# Patient Record
Sex: MALE | NOT HISPANIC OR LATINO | Employment: OTHER | URBAN - METROPOLITAN AREA
[De-identification: names, ages, dates, MRNs, and addresses within clinical notes are randomized per-mention and may not be internally consistent; named-entity substitution may affect disease eponyms.]

---

## 2018-07-10 ENCOUNTER — APPOINTMENT (OUTPATIENT)
Dept: RADIOLOGY | Facility: MEDICAL CENTER | Age: 58
End: 2018-07-10
Attending: EMERGENCY MEDICINE
Payer: MEDICAID

## 2018-07-10 ENCOUNTER — HOSPITAL ENCOUNTER (EMERGENCY)
Facility: MEDICAL CENTER | Age: 58
End: 2018-07-10
Attending: EMERGENCY MEDICINE
Payer: MEDICAID

## 2018-07-10 VITALS
WEIGHT: 229.28 LBS | TEMPERATURE: 96.8 F | DIASTOLIC BLOOD PRESSURE: 78 MMHG | BODY MASS INDEX: 32.82 KG/M2 | OXYGEN SATURATION: 94 % | HEIGHT: 70 IN | RESPIRATION RATE: 20 BRPM | HEART RATE: 62 BPM | SYSTOLIC BLOOD PRESSURE: 141 MMHG

## 2018-07-10 DIAGNOSIS — M19.012 OSTEOARTHRITIS OF BOTH SHOULDERS, UNSPECIFIED OSTEOARTHRITIS TYPE: ICD-10-CM

## 2018-07-10 DIAGNOSIS — M19.011 OSTEOARTHRITIS OF BOTH SHOULDERS, UNSPECIFIED OSTEOARTHRITIS TYPE: ICD-10-CM

## 2018-07-10 DIAGNOSIS — S46.912A SHOULDER STRAIN, LEFT, INITIAL ENCOUNTER: ICD-10-CM

## 2018-07-10 PROCEDURE — 73030 X-RAY EXAM OF SHOULDER: CPT | Mod: LT

## 2018-07-10 PROCEDURE — 99284 EMERGENCY DEPT VISIT MOD MDM: CPT

## 2018-07-10 PROCEDURE — 73030 X-RAY EXAM OF SHOULDER: CPT | Mod: RT

## 2018-07-10 RX ORDER — IBUPROFEN 800 MG/1
800 TABLET ORAL EVERY 8 HOURS PRN
Qty: 30 TAB | Refills: 0 | Status: SHIPPED | OUTPATIENT
Start: 2018-07-10 | End: 2018-07-23

## 2018-07-10 RX ORDER — PREDNISONE 20 MG/1
40 TABLET ORAL DAILY
Qty: 10 TAB | Refills: 0 | Status: SHIPPED | OUTPATIENT
Start: 2018-07-10 | End: 2018-07-15

## 2018-07-10 RX ORDER — LEVOTHYROXINE SODIUM 0.07 MG/1
50 TABLET ORAL
COMMUNITY
End: 2018-09-05 | Stop reason: SDUPTHER

## 2018-07-10 ASSESSMENT — PAIN SCALES - GENERAL
PAINLEVEL_OUTOF10: 5
PAINLEVEL_OUTOF10: 5
PAINLEVEL_OUTOF10: 7

## 2018-07-10 ASSESSMENT — ENCOUNTER SYMPTOMS
NECK PAIN: 0
FALLS: 0
NEUROLOGICAL NEGATIVE: 1
SHORTNESS OF BREATH: 0

## 2018-07-10 ASSESSMENT — LIFESTYLE VARIABLES: DO YOU DRINK ALCOHOL: NO

## 2018-07-10 NOTE — DISCHARGE INSTRUCTIONS
Shoulder Pain  Many things can cause shoulder pain, including:  · An injury to the area.  · Overuse of the shoulder.  · Arthritis.  The source of the pain can be:  · Inflammation.  · An injury to the shoulder joint.  · An injury to a tendon, ligament, or bone.  Follow these instructions at home:  Take these actions to help with your pain:  · Squeeze a soft ball or a foam pad as much as possible. This helps to keep the shoulder from swelling. It also helps to strengthen the arm.  · Take over-the-counter and prescription medicines only as told by your health care provider.  · If directed, apply ice to the area:  ¨ Put ice in a plastic bag.  ¨ Place a towel between your skin and the bag.  ¨ Leave the ice on for 20 minutes, 2-3 times per day. Stop applying ice if it does not help with the pain.  · If you were given a shoulder sling or immobilizer:  ¨ Wear it as told.  ¨ Remove it to shower or bathe.  ¨ Move your arm as little as possible, but keep your hand moving to prevent swelling.  Contact a health care provider if:  · Your pain gets worse.  · Your pain is not relieved with medicines.  · New pain develops in your arm, hand, or fingers.  Get help right away if:  · Your arm, hand, or fingers:  ¨ Tingle.  ¨ Become numb.  ¨ Become swollen.  ¨ Become painful.  ¨ Turn white or blue.  This information is not intended to replace advice given to you by your health care provider. Make sure you discuss any questions you have with your health care provider.  Document Released: 09/27/2006 Document Revised: 08/13/2017 Document Reviewed: 04/11/2016  Elsevier Interactive Patient Education © 2017 [employer]    Degenerative Arthritis  You have osteoarthritis. This is the wear and tear arthritis that comes with aging. It is also called degenerative arthritis. This is common in people past middle age. It is caused by stress on the joints. The large weight bearing joints of the lower extremities are most often affected. The knees,  hips, back, neck, and hands can become painful, swollen, and stiff. This is the most common type of arthritis. It comes on with age, carrying too much weight, or from an injury.  Treatment includes resting the sore joint until the pain and swelling improve. Crutches or a walker may be needed for severe flares. Only take over-the-counter or prescription medicines for pain, discomfort, or fever as directed by your caregiver. Local heat therapy may improve motion. Cortisone shots into the joint are sometimes used to reduce pain and swelling during flares.  Osteoarthritis is usually not crippling and progresses slowly. There are things you can do to decrease pain:  · Avoid high impact activities.   · Exercise regularly.   · Low impact exercises such as walking, biking and swimming help to keep the muscles strong and keep normal joint function.   · Stretching helps to keep your range of motion.   · Lose weight if you are overweight. This reduces joint stress.   In severe cases when you have pain at rest or increasing disability, joint surgery may be helpful. See your caregiver for follow-up treatment as recommended.   SEEK IMMEDIATE MEDICAL CARE IF:   · You have severe joint pain.   · Marked swelling and redness in your joint develops.   · You develop a high fever.   Document Released: 12/18/2006 Document Revised: 03/11/2013 Document Reviewed: 05/19/2008  [employer]® Patient Information ©2013 [employer].

## 2018-07-10 NOTE — ED PROVIDER NOTES
ED Provider Note    Scribed for Sadia Solomon D.O. by Rufino Dennis. 7/10/2018, 10:00 AM.    Primary care provider: No primary care provider on file.  Means of arrival: walk in  History obtained from: patient  History limited by: none    CHIEF COMPLAINT  Chief Complaint   Patient presents with   • Shoulder Pain     LAURA Fernandez is a 57 y.o. male with a history of bilateral shoulder dislocation who presents to the Emergency Department complaining of sudden and constant left shoulder pain stating yesterday. Me reports associated shoulder dislocation. Patient state that he was working in the attic yesterday, holding a cross beam and turned his body, causing his left shoulder to possibly dislocate. He presents to the ED for evaluation of persistent pain and possible continued dislocation. Patient has a history of dislocation and arthritis of his bilateral shoulders that was being followed and treated with cortisone by an orthopedist in [place], [place]. He is a recent resident here in [place] and does not have an attending orthopedist in [facility] but is scheduled to follow up with a primary in the coming weeks.  Patient denies neck pain, distal numbness, wrist pain, elbow pain.     REVIEW OF SYSTEMS  Review of Systems   Respiratory: Negative for shortness of breath.    Cardiovascular: Positive for chest pain.   Musculoskeletal: Positive for joint pain. Negative for falls and neck pain.        Shoulder pain, dislocation. Negative wrist pain, elbow pain   Neurological: Negative.    E.    PAST MEDICAL HISTORY   has a past medical history of Thyroid activity decreased.    SURGICAL HISTORY  patient denies any surgical history    SOCIAL HISTORY  Social History   Substance Use Topics   • Smoking status: Never Smoker   • Smokeless tobacco: Current User   • Alcohol use No      History   Drug Use No       FAMILY HISTORY  None noted    CURRENT MEDICATIONS  Home Medications     Reviewed by Meredith Alexander,  "FÉLIX (Registered Nurse) on 07/10/18 at 0954  Med List Status: Partial   Medication Last Dose Status   levothyroxine (SYNTHROID) 75 MCG Tab  Active                ALLERGIES  No Known Allergies    PHYSICAL EXAM  VITAL SIGNS: /85   Pulse (!) 58   Temp 36.4 °C (97.5 °F)   Resp 16   Ht 1.778 m (5' 10\")   Wt 104 kg (229 lb 4.5 oz)   SpO2 94%   BMI 32.90 kg/m²   Vitals reviewed.  Constitutional: Patient is oriented to person, place, and time. Appears well-developed and well-nourished. No distress.    Head: Normocephalic and atraumatic.   Mouth/Throat: Oropharynx is clear and moist  Eyes: Conjunctivae are normal.   Neck: Normal range of motion. Neck supple.  Musculoskeletal: No edema and no tenderness. Diffuse tenderness to right shoulder no changes in ROM. Mild fullness and diffuse tenderness of the left shoulder with no deformities. Reduced ROM  Neurological: No focal deficits.   Skin: Skin is warm and dry. No erythema. No pallor.   Psychiatric: Patient has a normal mood and affect.     RADIOLOGY  DX-SHOULDER 2+ LEFT   Final Result      1.  Severe degenerative change of LEFT shoulder.   2.  No fracture or dislocation.      DX-SHOULDER 2+ RIGHT   Final Result      1.  Mild to moderate degenerative change of RIGHT shoulder.   2.  No fracture or dislocation.      The radiologist's interpretation of all radiological studies have been reviewed by me.    COURSE & MEDICAL DECISION MAKING  Pertinent Labs & Imaging studies reviewed. (See chart for details)    Obtained and reviewed past medical records that indicate no prior visits.    10:00 AM - Patient seen and examined at bedside.  Patient does not appear to have any deformities or significant dislocations.  There is some fullness of the left side anteriorly over the shoulder.  Patient has chronic pain to both these areas, right is actually worse on the chronic level than the left.  He is requesting x-rays of both shoulders. The differential diagnoses include but " are not limited to: fracture vs strain vs dislocation      11:52 AM - Recheck: Patient re-evaluated at [facility]. Patient's radiology results discussed.  There is moderate to severe osteoarthritic changes.  No fracture or dislocation.  Discussed patient's condition and treatment plan. He is instructed to ice the extremity and use ibuprofen as needed for pain control. Patient will be discharged with instructions and provided with strict return precautions. Patient will be sent home with a prescription for Decadron. Advised to follow up with his primary to establish and orthopedist in [facility]. Instructed to return to Emergency Department immediately if any new or worsening symptoms.    The patient will return for new or worsening symptoms and is stable at the time of discharge.    The patient is referred to a primary physician for blood pressure management, diabetic screening, and for all other preventative health concerns.    DISPOSITION:  Patient will be discharged home in stable condition.    FOLLOW UP:  [facility], Emergency Dept  [address] [id number] [phone]    If symptoms worsen    Your Physician  Varies      as planned    Flako Morales M.D.  [address] [facility] [address] [facility] [address] [address]  [phone]    Schedule an appointment as soon as possible for a visit        OUTPATIENT MEDICATIONS:  Discharge Medication List as of 7/10/2018 12:02 PM      START taking these medications    Details   ibuprofen (MOTRIN) 800 MG Tab Take 1 Tab by mouth every 8 hours as needed., Disp-30 Tab, R-0, Print Rx Paper      predniSONE (DELTASONE) 20 MG Tab Take 2 Tabs by mouth every day for 5 days., Disp-10 Tab, R-0, Print Rx Paper           FINAL IMPRESSION  1. Osteoarthritis of both shoulders, unspecified osteoarthritis type    2. Shoulder strain, left, initial encounter          I, Rufino Dennis (Scribe), am scribing for, and in the presence of, Sadia Solomon D.O..    Electronically signed by:  Rufino Dennis (Scribe), 7/10/2018    I, Sadia Solomon D.O. personally performed the services described in this documentation, as scribed by Rufino Dennis in my presence, and it is both accurate and complete.    The note accurately reflects work and decisions made by me.  Sadia Solomon  7/10/2018  5:24 PM

## 2018-07-10 NOTE — ED NOTES
Pt reports left shoulder pain that started yesterday when he was working in the attic, pt states he was holding on to a truss and turned, this motion caused the pain, pt's wife massaged the shoulder and pt used ice pack with no relief

## 2018-07-10 NOTE — ED TRIAGE NOTES
"Chief Complaint   Patient presents with   • Shoulder Pain     L     Ambulatory to triage. Reports L shoulder pain, thinks he dislocated it and put it back in place now. No obvious deformity. Hx of the same.      /85   Pulse (!) 58   Temp 36.4 °C (97.5 °F)   Resp 16   Ht 1.778 m (5' 10\")   Wt 104 kg (229 lb 4.5 oz)   SpO2 94%   BMI 32.90 kg/m²     Pt Informed regarding triage process and verbalized understanding to inform triage tech or RN for any changes in condition.  Placed in lobby.    "

## 2018-07-12 ENCOUNTER — OFFICE VISIT (OUTPATIENT)
Dept: INTERNAL MEDICINE | Facility: MEDICAL CENTER | Age: 58
End: 2018-07-12
Payer: MEDICAID

## 2018-07-12 VITALS
BODY MASS INDEX: 33.21 KG/M2 | HEIGHT: 70 IN | WEIGHT: 232 LBS | OXYGEN SATURATION: 98 % | DIASTOLIC BLOOD PRESSURE: 76 MMHG | HEART RATE: 83 BPM | RESPIRATION RATE: 16 BRPM | TEMPERATURE: 97.9 F | SYSTOLIC BLOOD PRESSURE: 130 MMHG

## 2018-07-12 DIAGNOSIS — N52.9 ERECTILE DYSFUNCTION, UNSPECIFIED ERECTILE DYSFUNCTION TYPE: ICD-10-CM

## 2018-07-12 DIAGNOSIS — E55.9 VITAMIN D DEFICIENCY: ICD-10-CM

## 2018-07-12 DIAGNOSIS — E66.9 OBESITY (BMI 30-39.9): ICD-10-CM

## 2018-07-12 DIAGNOSIS — Z00.00 HEALTH MAINTENANCE EXAMINATION: Primary | ICD-10-CM

## 2018-07-12 DIAGNOSIS — E03.9 HYPOTHYROIDISM, UNSPECIFIED TYPE: ICD-10-CM

## 2018-07-12 DIAGNOSIS — M19.111 POST-TRAUMATIC OSTEOARTHRITIS OF BOTH SHOULDERS: ICD-10-CM

## 2018-07-12 DIAGNOSIS — M19.112 POST-TRAUMATIC OSTEOARTHRITIS OF BOTH SHOULDERS: ICD-10-CM

## 2018-07-12 PROCEDURE — 99204 OFFICE O/P NEW MOD 45 MIN: CPT | Mod: GC | Performed by: INTERNAL MEDICINE

## 2018-07-12 ASSESSMENT — PATIENT HEALTH QUESTIONNAIRE - PHQ9: CLINICAL INTERPRETATION OF PHQ2 SCORE: 0

## 2018-07-12 NOTE — ASSESSMENT & PLAN NOTE
- Colon cancer colonoscopy screening: order today.   - Prostate cancer screening: not indicated at this age  - Lung cancer screening: non smoker no screening indicated  - AAA screening: Not indicated.  - Osteoporosis screening: Not indicated, no risk factor.  - HIV screening: done before it was negative   - flu vaccine: recommended to patient, dose not want to take it.   - Tdap: Given in 2018  - PNA vac: not indicated.   - Basic Labs within the last 12 months: order CBC, CMP, lipid profile, hemoglobin A1c and TSH    - Vit D: Order  - Shingles vac: Recommended to patient.

## 2018-07-12 NOTE — ASSESSMENT & PLAN NOTE
- erectile dysfunction for 2 years, mainly difficulty maintaining erection, noted decreased sexual desire, still has early morning erection.   - Denies intermittent claudication, chest pain and shortness of breath.  - Physical examination within normal.  - No history of diabetes or hypertension.  Plan:  -Differential diagnoses include peripheral vascular disease versus hypogonadism and performance anxiety.  - Order hemoglobin A1c, serum testosterone total.  -  Follow-up in 4 weeks.
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
## 2018-07-12 NOTE — ASSESSMENT & PLAN NOTE
-Diagnosed 2 months ago.  - On vitamin D 5000 international units daily.  Plan:  - Recheck vitamin D level.  - We'll consider switching the patient to low dose maintenance vitamin D

## 2018-07-12 NOTE — LETTER
[facility]  Perla Sherman M.D.  [address]  [place] [address]  Fax: [phone]   Authorization for Release/Disclosure of   Protected Health Information   Name: JARED FERNANDEZ : 1960 SSN: [id number]   Address: [address] [place] [address] [place] [address] Phone:    [phone] (home)    I authorize the entity listed below to release/disclose the PHI below to:   [facility]/Perla Sherman M.D. and Perla Sherman M.D.   Provider or Entity Name:     Address   City, State, Zip   Phone:      Fax:     Reason for request: continuity of care   Information to be released:    [  ] LAST COLONOSCOPY,  including any PATH REPORT and follow-up  [  ] LAST FIT/COLOGUARD RESULT [  ] LAST DEXA  [  ] LAST MAMMOGRAM  [  ] LAST PAP  [  ] LAST LABS [  ] RETINA EXAM REPORT  [  ] IMMUNIZATION RECORDS  [  ] Release all info      [  ] Check here and initial the line next to each item to release ALL health information INCLUDING  _____ Care and treatment for drug and / or alcohol abuse  _____ HIV testing, infection status, or AIDS  _____ Genetic Testing    DATES OF SERVICE OR TIME PERIOD TO BE DISCLOSED: _____________  I understand and acknowledge that:  * This Authorization may be revoked at any time by you in writing, except if your health information has already been used or disclosed.  * Your health information that will be used or disclosed as a result of you signing this authorization could be re-disclosed by the recipient. If this occurs, your re-disclosed health information may no longer be protected by State or Federal laws.  * You may refuse to sign this Authorization. Your refusal will not affect your ability to obtain treatment.  * This Authorization becomes effective upon signing and will  on (date) __________.      If no date is indicated, this Authorization will  one (1) year from the signature date.    Name: Jared Fernandez    Signature:   Date:     2018            PLEASE FAX REQUESTED RECORDS BACK TO: [phone]

## 2018-07-12 NOTE — LETTER
[facility]  Perla Sherman M.D.  [address]  [place] [address]  Fax: [phone]   Authorization for Release/Disclosure of   Protected Health Information   Name: JARED FERNANDEZ : 1960 SSN: [id number]   Address: [address] [place] [address] [place] [address] Phone:    [phone] (home)    I authorize the entity listed below to release/disclose the PHI below to:   [facility]/Perla Sherman M.D. and Perla Sherman M.D.   Provider or Entity Name:     Address   City, State, Zip   Phone:      Fax:     Reason for request: continuity of care   Information to be released:    [  ] LAST COLONOSCOPY,  including any PATH REPORT and follow-up  [  ] LAST FIT/COLOGUARD RESULT [  ] LAST DEXA  [  ] LAST MAMMOGRAM  [  ] LAST PAP  [  ] LAST LABS [  ] RETINA EXAM REPORT  [  ] IMMUNIZATION RECORDS  [  ] Release all info      [  ] Check here and initial the line next to each item to release ALL health information INCLUDING  _____ Care and treatment for drug and / or alcohol abuse  _____ HIV testing, infection status, or AIDS  _____ Genetic Testing    DATES OF SERVICE OR TIME PERIOD TO BE DISCLOSED: _____________  I understand and acknowledge that:  * This Authorization may be revoked at any time by you in writing, except if your health information has already been used or disclosed.  * Your health information that will be used or disclosed as a result of you signing this authorization could be re-disclosed by the recipient. If this occurs, your re-disclosed health information may no longer be protected by State or Federal laws.  * You may refuse to sign this Authorization. Your refusal will not affect your ability to obtain treatment.  * This Authorization becomes effective upon signing and will  on (date) __________.      If no date is indicated, this Authorization will  one (1) year from the signature date.    Name: Jared Fernandez    Signature:   Date:     2018            PLEASE FAX REQUESTED RECORDS BACK TO: [phone]
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
## 2018-07-12 NOTE — PATIENT INSTRUCTIONS
- please follow up referrals   - please do the labs   - please start taking Tylenol 1gm 3 times a day    Arthritis  Introduction  Arthritis is a term that is commonly used to refer to joint pain or joint disease. There are more than 100 types of arthritis.  What are the causes?  The most common cause of this condition is wear and tear of a joint. Other causes include:  · Gout.  · Inflammation of a joint.  · An infection of a joint.  · Sprains and other injuries near the joint.  · A drug reaction or allergic reaction.  In some cases, the cause may not be known.  What are the signs or symptoms?  The main symptom of this condition is pain in the joint with movement. Other symptoms include:  · Redness, swelling, or stiffness at a joint.  · Warmth coming from the joint.  · Fever.  · Overall feeling of illness.  How is this diagnosed?  This condition may be diagnosed with a physical exam and tests, including:  · Blood tests.  · Urine tests.  · Imaging tests, such as MRI, X-rays, or a CT scan.  Sometimes, fluid is removed from a joint for testing.  How is this treated?  Treatment for this condition may involve:  · Treatment of the cause, if it is known.  · Rest.  · Raising (elevating) the joint.  · Applying cold or hot packs to the joint.  · Medicines to improve symptoms and reduce inflammation.  · Injections of a steroid such as cortisone into the joint to help reduce pain and inflammation.  Depending on the cause of your arthritis, you may need to make lifestyle changes to reduce stress on your joint. These changes may include exercising more and losing weight.  Follow these instructions at home:  Medicines  · Take over-the-counter and prescription medicines only as told by your health care provider.  · Do not take aspirin to relieve pain if gout is suspected.  Activity  · Rest your joint if told by your health care provider. Rest is important when your disease is active and your joint feels painful, swollen, or  stiff.  · Avoid activities that make the pain worse. It is important to balance activity with rest.  · Exercise your joint regularly with range-of-motion exercises as told by your health care provider. Try doing low-impact exercise, such as:  ¨ Swimming.  ¨ Water aerobics.  ¨ Biking.  ¨ Walking.  Joint Care   · If your joint is swollen, keep it elevated if told by your health care provider.  · If your joint feels stiff in the morning, try taking a warm shower.  · If directed, apply heat to the joint. If you have diabetes, do not apply heat without permission from your health care provider.  ¨ Put a towel between the joint and the hot pack or heating pad.  ¨ Leave the heat on the area for 20-30 minutes.  · If directed, apply ice to the joint:  ¨ Put ice in a plastic bag.  ¨ Place a towel between your skin and the bag.  ¨ Leave the ice on for 20 minutes, 2-3 times per day.  · Keep all follow-up visits as told by your health care provider. This is important.  Contact a health care provider if:  · The pain gets worse.  · You have a fever.  Get help right away if:  · You develop severe joint pain, swelling, or redness.  · Many joints become painful and swollen.  · You develop severe back pain.  · You develop severe weakness in your leg.  · You cannot control your bladder or bowels.  This information is not intended to replace advice given to you by your health care provider. Make sure you discuss any questions you have with your health care provider.  Document Released: 01/25/2006 Document Revised: 05/25/2017 Document Reviewed: 03/14/2016  © 2017 [employer]

## 2018-07-12 NOTE — ASSESSMENT & PLAN NOTE
- Body mass index is 33.29 kg/m².  - Patient was advised to eat healthy and increase his physical activity.  - Goal moderate intensity exercise 150 minutes per week.

## 2018-07-12 NOTE — ASSESSMENT & PLAN NOTE
- Diagnosed couple of months ago.   - denies any symptoms, does not know what his TSH was.   - currently on levothyroxine 75 µg daily.  Plan:  - Repeat TSH  - Follow-up in 4 weeks

## 2018-07-19 ENCOUNTER — TELEPHONE (OUTPATIENT)
Dept: INTERNAL MEDICINE | Facility: MEDICAL CENTER | Age: 58
End: 2018-07-19

## 2018-07-19 DIAGNOSIS — E03.9 HYPOTHYROIDISM, UNSPECIFIED TYPE: ICD-10-CM

## 2018-07-19 DIAGNOSIS — E66.9 OBESITY (BMI 30-39.9): ICD-10-CM

## 2018-07-19 DIAGNOSIS — E55.9 VITAMIN D DEFICIENCY: ICD-10-CM

## 2018-07-19 DIAGNOSIS — Z00.00 HEALTH MAINTENANCE EXAMINATION: ICD-10-CM

## 2018-07-19 DIAGNOSIS — N52.9 ERECTILE DYSFUNCTION, UNSPECIFIED ERECTILE DYSFUNCTION TYPE: ICD-10-CM

## 2018-07-23 DIAGNOSIS — M19.112 POST-TRAUMATIC OSTEOARTHRITIS OF BOTH SHOULDERS: ICD-10-CM

## 2018-07-23 DIAGNOSIS — M19.111 POST-TRAUMATIC OSTEOARTHRITIS OF BOTH SHOULDERS: ICD-10-CM

## 2018-07-23 RX ORDER — NAPROXEN 500 MG/1
500 TABLET ORAL 2 TIMES DAILY WITH MEALS
Qty: 60 TAB | Refills: 2 | Status: SHIPPED | OUTPATIENT
Start: 2018-07-23

## 2018-09-04 ENCOUNTER — PATIENT MESSAGE (OUTPATIENT)
Dept: INTERNAL MEDICINE | Facility: MEDICAL CENTER | Age: 58
End: 2018-09-04

## 2018-09-04 NOTE — TELEPHONE ENCOUNTER
From: Jared Fernandez  To: Perla Sherman M.D.  Sent: 9/4/2018 11:24 AM PDT  Subject: Prescription Question    Please send renewal for my Thyroid medication to [facility] in [facility]. I am completely out.    Thank you    Jared

## 2018-09-05 NOTE — TELEPHONE ENCOUNTER
PT SEEN: 7/12/18 WITH Dr. Sherman NEXT APPT None  Was the patient seen in the last year in this department? Yes     Does patient have an active prescription for medications requested? No     Received Request Via: Patient

## 2018-09-06 RX ORDER — LEVOTHYROXINE SODIUM 0.07 MG/1
75 TABLET ORAL
Qty: 90 TAB | Refills: 0 | Status: SHIPPED | OUTPATIENT
Start: 2018-09-06 | End: 2018-10-16 | Stop reason: SDUPTHER

## 2018-10-16 ENCOUNTER — OFFICE VISIT (OUTPATIENT)
Dept: MEDICAL GROUP | Facility: MEDICAL CENTER | Age: 58
End: 2018-10-16
Payer: COMMERCIAL

## 2018-10-16 VITALS
SYSTOLIC BLOOD PRESSURE: 132 MMHG | OXYGEN SATURATION: 94 % | BODY MASS INDEX: 34.07 KG/M2 | DIASTOLIC BLOOD PRESSURE: 82 MMHG | WEIGHT: 230 LBS | HEIGHT: 69 IN | TEMPERATURE: 99.5 F | RESPIRATION RATE: 14 BRPM | HEART RATE: 72 BPM

## 2018-10-16 DIAGNOSIS — M19.112 POST-TRAUMATIC OSTEOARTHRITIS OF BOTH SHOULDERS: ICD-10-CM

## 2018-10-16 DIAGNOSIS — M19.111 POST-TRAUMATIC OSTEOARTHRITIS OF BOTH SHOULDERS: ICD-10-CM

## 2018-10-16 DIAGNOSIS — Z28.21 REFUSED INFLUENZA VACCINE: ICD-10-CM

## 2018-10-16 DIAGNOSIS — Z00.00 ROUTINE GENERAL MEDICAL EXAMINATION AT A HEALTH CARE FACILITY: ICD-10-CM

## 2018-10-16 DIAGNOSIS — E66.9 OBESITY (BMI 30-39.9): ICD-10-CM

## 2018-10-16 DIAGNOSIS — Z12.11 COLON CANCER SCREENING: ICD-10-CM

## 2018-10-16 DIAGNOSIS — E03.9 IDIOPATHIC HYPOTHYROIDISM: ICD-10-CM

## 2018-10-16 PROCEDURE — 99396 PREV VISIT EST AGE 40-64: CPT | Performed by: FAMILY MEDICINE

## 2018-10-16 RX ORDER — LEVOTHYROXINE SODIUM 0.07 MG/1
75 TABLET ORAL
Qty: 90 TAB | Refills: 2 | Status: SHIPPED | OUTPATIENT
Start: 2018-10-16 | End: 2018-12-20 | Stop reason: SDUPTHER

## 2018-10-16 ASSESSMENT — ENCOUNTER SYMPTOMS
MYALGIAS: 0
WHEEZING: 0
PALPITATIONS: 0
FOCAL WEAKNESS: 0
DIARRHEA: 0
SHORTNESS OF BREATH: 0
CHILLS: 0
HEARTBURN: 0
DOUBLE VISION: 0
WEIGHT LOSS: 0
VOMITING: 0
BLOOD IN STOOL: 0
NAUSEA: 0
SORE THROAT: 0
INSOMNIA: 1
BACK PAIN: 0
MEMORY LOSS: 0
ORTHOPNEA: 0
TINGLING: 1
BRUISES/BLEEDS EASILY: 0
BLURRED VISION: 0
NERVOUS/ANXIOUS: 0
TREMORS: 0
DIAPHORESIS: 0
DIZZINESS: 0
HALLUCINATIONS: 0
NECK PAIN: 0
SEIZURES: 0
FEVER: 0
CLAUDICATION: 0
SENSORY CHANGE: 0
HEMOPTYSIS: 0
SPEECH CHANGE: 0
COUGH: 0
SPUTUM PRODUCTION: 0
DEPRESSION: 0
HEADACHES: 0
LOSS OF CONSCIOUSNESS: 0
ABDOMINAL PAIN: 0

## 2018-10-16 ASSESSMENT — LIFESTYLE VARIABLES: SUBSTANCE_ABUSE: 0

## 2018-10-16 NOTE — PROGRESS NOTES
Subjective:      Jared Fernandez is a 57 y.o. male who presents with Annual Exam        He is here for his general examination and to establish care.    HPI     has a past medical history of Alcohol abuse; Collar bone fracture; and Thyroid activity decreased.   has a past surgical history that includes hernia repair; ankle orif (Left); carpal tunnel release (Right); appendectomy child; ankle orif (Right); wrist arthroscopy (Right); and knee reconstruction (Right).  family history includes Breast Cancer in his mother; Dementia in his mother; Diabetes in his mother; Hyperlipidemia in his mother; Hypertension in his mother; No Known Problems in his father.   reports that he has never smoked. He has quit using smokeless tobacco. His smokeless tobacco use included Chew. He reports that he does not drink alcohol or use drugs.      Current Outpatient Prescriptions:   •  levothyroxine (SYNTHROID) 75 MCG Tab, Take 1 Tab by mouth Every morning on an empty stomach., Disp: 90 Tab, Rfl: 2  •  naproxen (NAPROSYN) 500 MG Tab, Take 1 Tab by mouth 2 times a day, with meals., Disp: 60 Tab, Rfl: 2  has No Known Allergies.    Review of Systems   Constitutional: Positive for malaise/fatigue. Negative for chills, diaphoresis, fever and weight loss.   HENT: Negative for congestion, hearing loss, sore throat and tinnitus.    Eyes: Negative for blurred vision and double vision.   Respiratory: Negative for cough, hemoptysis, sputum production, shortness of breath and wheezing.    Cardiovascular: Negative for chest pain, palpitations, orthopnea, claudication and leg swelling.   Gastrointestinal: Negative for abdominal pain, blood in stool, diarrhea, heartburn, nausea and vomiting.   Genitourinary: Negative for dysuria, frequency, hematuria and urgency.   Musculoskeletal: Positive for joint pain. Negative for back pain, myalgias and neck pain.        Shoulder pain bilateral, ankle pain bilateral   Skin: Negative for rash.   Neurological:  "Positive for tingling. Negative for dizziness, tremors, sensory change, speech change, focal weakness, seizures, loss of consciousness and headaches.        Numbness left lateral leg  Left hand tingling   Endo/Heme/Allergies: Negative for environmental allergies. Does not bruise/bleed easily.   Psychiatric/Behavioral: Negative for depression, hallucinations, memory loss, substance abuse and suicidal ideas. The patient has insomnia. The patient is not nervous/anxious.           Objective:     /82   Pulse 72   Temp 37.5 °C (99.5 °F)   Resp 14   Ht 1.753 m (5' 9\")   Wt 104.3 kg (230 lb)   SpO2 94%   BMI 33.97 kg/m²      Physical Exam   Constitutional: He is oriented to person, place, and time. He appears well-developed and well-nourished.   HENT:   Head: Normocephalic and atraumatic.   Mouth/Throat: Oropharynx is clear and moist.   Eyes: Pupils are equal, round, and reactive to light. EOM are normal. Left eye exhibits no discharge.   Neck: Normal range of motion. Neck supple. No JVD present. No thyromegaly present.   Cardiovascular: Normal rate, regular rhythm and normal heart sounds.    No murmur heard.  Pulmonary/Chest: Effort normal and breath sounds normal. No respiratory distress. He has no wheezes. He has no rales.   Abdominal: Soft. Bowel sounds are normal. He exhibits no distension and no mass. There is no tenderness.   Musculoskeletal: Normal range of motion. He exhibits no edema.   Numerous scars   Lymphadenopathy:     He has no cervical adenopathy.   Neurological: He is alert and oriented to person, place, and time. Coordination normal.   Skin: Skin is warm and dry. No rash noted. No erythema.   Psychiatric: He has a normal mood and affect. His behavior is normal.   Vitals reviewed.              Assessment/Plan:     1. Routine general medical examination at a health care facility  He is generally well.  Medical problems are stable.    2. Colon cancer screening  Referral discussed and placed  - " REFERRAL TO GASTROENTEROLOGY    3. Obesity (BMI 30-39.9)  Mild obesity, target of 210#    4. Refused influenza vaccine  Does not take this    5. Post-traumatic osteoarthritis of both shoulders  Long term problem, stable.  Referral discussed and placed  - REFERRAL TO ORTHOPEDICS    6. Idiopathic hypothyroidism  Stable problem.  Routine orders discussed and placed  - TSH; Future  - levothyroxine (SYNTHROID) 75 MCG Tab; Take 1 Tab by mouth Every morning on an empty stomach.  Dispense: 90 Tab; Refill: 2

## 2018-12-20 DIAGNOSIS — E03.9 IDIOPATHIC HYPOTHYROIDISM: ICD-10-CM

## 2018-12-20 RX ORDER — LEVOTHYROXINE SODIUM 0.07 MG/1
75 TABLET ORAL
Qty: 30 TAB | Refills: 0 | Status: SHIPPED | OUTPATIENT
Start: 2018-12-20 | End: 2019-01-16 | Stop reason: SDUPTHER

## 2019-01-16 DIAGNOSIS — E03.9 IDIOPATHIC HYPOTHYROIDISM: ICD-10-CM

## 2019-01-16 RX ORDER — LEVOTHYROXINE SODIUM 0.07 MG/1
75 TABLET ORAL
Qty: 30 TAB | Refills: 4 | Status: SHIPPED | OUTPATIENT
Start: 2019-01-16 | End: 2019-07-14 | Stop reason: SDUPTHER

## 2019-02-28 ENCOUNTER — HOSPITAL ENCOUNTER (OUTPATIENT)
Dept: LAB | Facility: MEDICAL CENTER | Age: 59
End: 2019-02-28
Attending: FAMILY MEDICINE
Payer: COMMERCIAL

## 2019-02-28 DIAGNOSIS — E03.9 IDIOPATHIC HYPOTHYROIDISM: ICD-10-CM

## 2019-02-28 LAB — TSH SERPL DL<=0.005 MIU/L-ACNC: 2.33 UIU/ML (ref 0.38–5.33)

## 2019-02-28 PROCEDURE — 36415 COLL VENOUS BLD VENIPUNCTURE: CPT

## 2019-02-28 PROCEDURE — 84443 ASSAY THYROID STIM HORMONE: CPT

## 2019-03-04 ENCOUNTER — HOSPITAL ENCOUNTER (OUTPATIENT)
Dept: LAB | Facility: MEDICAL CENTER | Age: 59
End: 2019-03-04
Attending: FAMILY MEDICINE
Payer: COMMERCIAL

## 2019-03-04 DIAGNOSIS — N52.9 ERECTILE DYSFUNCTION, UNSPECIFIED ERECTILE DYSFUNCTION TYPE: ICD-10-CM

## 2019-03-04 DIAGNOSIS — E55.9 VITAMIN D DEFICIENCY: ICD-10-CM

## 2019-03-04 LAB — 25(OH)D3 SERPL-MCNC: 15 NG/ML (ref 30–100)

## 2019-03-04 PROCEDURE — 84403 ASSAY OF TOTAL TESTOSTERONE: CPT

## 2019-03-04 PROCEDURE — 82306 VITAMIN D 25 HYDROXY: CPT

## 2019-03-04 PROCEDURE — 84270 ASSAY OF SEX HORMONE GLOBUL: CPT

## 2019-03-04 PROCEDURE — 36415 COLL VENOUS BLD VENIPUNCTURE: CPT

## 2019-03-06 LAB
SHBG SERPL-SCNC: 52 NMOL/L (ref 11–80)
TESTOST FREE MFR SERPL: 1.4 % (ref 1.6–2.9)
TESTOST FREE SERPL-MCNC: 72 PG/ML (ref 47–244)
TESTOST SERPL-MCNC: 502 NG/DL (ref 300–890)

## 2019-03-07 DIAGNOSIS — R79.89 DECREASED FREE TESTOSTERONE LEVEL IN MALE: ICD-10-CM

## 2019-03-07 DIAGNOSIS — N52.1 ERECTILE DYSFUNCTION DUE TO DISEASES CLASSIFIED ELSEWHERE: ICD-10-CM

## 2019-04-10 ENCOUNTER — HOSPITAL ENCOUNTER (OUTPATIENT)
Dept: LAB | Facility: MEDICAL CENTER | Age: 59
End: 2019-04-10
Attending: PHYSICIAN ASSISTANT
Payer: MEDICAID

## 2019-04-10 LAB
PSA SERPL-MCNC: 0.75 NG/ML (ref 0–4)
TESTOST SERPL-MCNC: 539 NG/DL (ref 175–781)

## 2019-04-10 PROCEDURE — 84403 ASSAY OF TOTAL TESTOSTERONE: CPT

## 2019-04-10 PROCEDURE — 84153 ASSAY OF PSA TOTAL: CPT

## 2019-07-14 DIAGNOSIS — E03.9 IDIOPATHIC HYPOTHYROIDISM: ICD-10-CM

## 2019-07-14 RX ORDER — LEVOTHYROXINE SODIUM 0.07 MG/1
75 TABLET ORAL
Qty: 30 TAB | Refills: 9 | Status: SHIPPED | OUTPATIENT
Start: 2019-07-14 | End: 2020-05-20

## 2020-05-20 DIAGNOSIS — E03.9 IDIOPATHIC HYPOTHYROIDISM: ICD-10-CM

## 2020-05-20 RX ORDER — LEVOTHYROXINE SODIUM 0.07 MG/1
TABLET ORAL
Qty: 30 TAB | Refills: 4 | Status: SHIPPED | OUTPATIENT
Start: 2020-05-20 | End: 2020-11-05

## 2020-11-09 ENCOUNTER — APPOINTMENT (OUTPATIENT)
Dept: RADIOLOGY | Facility: MEDICAL CENTER | Age: 60
End: 2020-11-09
Attending: EMERGENCY MEDICINE
Payer: MEDICAID

## 2020-11-09 ENCOUNTER — HOSPITAL ENCOUNTER (EMERGENCY)
Facility: MEDICAL CENTER | Age: 60
End: 2020-11-09
Payer: MEDICAID

## 2020-11-09 ENCOUNTER — HOSPITAL ENCOUNTER (EMERGENCY)
Facility: MEDICAL CENTER | Age: 60
End: 2020-11-09
Attending: EMERGENCY MEDICINE
Payer: MEDICAID

## 2020-11-09 VITALS
TEMPERATURE: 98 F | DIASTOLIC BLOOD PRESSURE: 84 MMHG | OXYGEN SATURATION: 94 % | RESPIRATION RATE: 21 BRPM | WEIGHT: 190 LBS | SYSTOLIC BLOOD PRESSURE: 129 MMHG | BODY MASS INDEX: 27.2 KG/M2 | HEIGHT: 70 IN | HEART RATE: 69 BPM

## 2020-11-09 DIAGNOSIS — S39.012A STRAIN OF LUMBAR REGION, INITIAL ENCOUNTER: ICD-10-CM
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
## 2020-11-09 LAB
ABO GROUP BLD: NORMAL
ALBUMIN SERPL BCP-MCNC: 4 G/DL (ref 3.2–4.9)
ALBUMIN/GLOB SERPL: 1.7 G/DL
ALP SERPL-CCNC: 71 U/L (ref 30–99)
ALT SERPL-CCNC: 22 U/L (ref 2–50)
ANION GAP SERPL CALC-SCNC: 15 MMOL/L (ref 7–16)
AST SERPL-CCNC: 34 U/L (ref 12–45)
BASOPHILS # BLD AUTO: 0.6 % (ref 0–1.8)
BASOPHILS # BLD: 0.05 K/UL (ref 0–0.12)
BILIRUB SERPL-MCNC: 0.4 MG/DL (ref 0.1–1.5)
BLD GP AB SCN SERPL QL: NORMAL
BUN SERPL-MCNC: 24 MG/DL (ref 8–22)
CALCIUM SERPL-MCNC: 9.1 MG/DL (ref 8.5–10.5)
CHLORIDE SERPL-SCNC: 103 MMOL/L (ref 96–112)
CO2 SERPL-SCNC: 21 MMOL/L (ref 20–33)
CREAT SERPL-MCNC: 0.74 MG/DL (ref 0.5–1.4)
EOSINOPHIL # BLD AUTO: 0.25 K/UL (ref 0–0.51)
EOSINOPHIL NFR BLD: 3 % (ref 0–6.9)
ERYTHROCYTE [DISTWIDTH] IN BLOOD BY AUTOMATED COUNT: 45.2 FL (ref 35.9–50)
ETHANOL BLD-MCNC: 29.3 MG/DL (ref 0–10)
GLOBULIN SER CALC-MCNC: 2.4 G/DL (ref 1.9–3.5)
GLUCOSE SERPL-MCNC: 108 MG/DL (ref 65–99)
HCT VFR BLD AUTO: 44.4 % (ref 42–52)
HGB BLD-MCNC: 14.7 G/DL (ref 14–18)
IMM GRANULOCYTES # BLD AUTO: 0.08 K/UL (ref 0–0.11)
IMM GRANULOCYTES NFR BLD AUTO: 1 % (ref 0–0.9)
LYMPHOCYTES # BLD AUTO: 1.84 K/UL (ref 1–4.8)
LYMPHOCYTES NFR BLD: 22.1 % (ref 22–41)
MCH RBC QN AUTO: 31 PG (ref 27–33)
MCHC RBC AUTO-ENTMCNC: 33.1 G/DL (ref 33.7–35.3)
MCV RBC AUTO: 93.7 FL (ref 81.4–97.8)
MONOCYTES # BLD AUTO: 0.73 K/UL (ref 0–0.85)
MONOCYTES NFR BLD AUTO: 8.8 % (ref 0–13.4)
NEUTROPHILS # BLD AUTO: 5.36 K/UL (ref 1.82–7.42)
NEUTROPHILS NFR BLD: 64.5 % (ref 44–72)
NRBC # BLD AUTO: 0 K/UL
NRBC BLD-RTO: 0 /100 WBC
PLATELET # BLD AUTO: 198 K/UL (ref 164–446)
PMV BLD AUTO: 8.5 FL (ref 9–12.9)
POTASSIUM SERPL-SCNC: 4.1 MMOL/L (ref 3.6–5.5)
PROT SERPL-MCNC: 6.4 G/DL (ref 6–8.2)
RBC # BLD AUTO: 4.74 M/UL (ref 4.7–6.1)
RH BLD: NORMAL
SODIUM SERPL-SCNC: 139 MMOL/L (ref 135–145)
WBC # BLD AUTO: 8.3 K/UL (ref 4.8–10.8)

## 2020-11-09 PROCEDURE — 71260 CT THORAX DX C+: CPT

## 2020-11-09 PROCEDURE — 80053 COMPREHEN METABOLIC PANEL: CPT

## 2020-11-09 PROCEDURE — A9270 NON-COVERED ITEM OR SERVICE: HCPCS | Performed by: EMERGENCY MEDICINE

## 2020-11-09 PROCEDURE — 99285 EMERGENCY DEPT VISIT HI MDM: CPT

## 2020-11-09 PROCEDURE — 305948 HCHG GREEN TRAUMA ACT PRE-NOTIFY NO CC

## 2020-11-09 PROCEDURE — 72125 CT NECK SPINE W/O DYE: CPT

## 2020-11-09 PROCEDURE — 80307 DRUG TEST PRSMV CHEM ANLYZR: CPT

## 2020-11-09 PROCEDURE — 71045 X-RAY EXAM CHEST 1 VIEW: CPT

## 2020-11-09 PROCEDURE — 700117 HCHG RX CONTRAST REV CODE 255: Performed by: EMERGENCY MEDICINE

## 2020-11-09 PROCEDURE — 86850 RBC ANTIBODY SCREEN: CPT

## 2020-11-09 PROCEDURE — 72170 X-RAY EXAM OF PELVIS: CPT

## 2020-11-09 PROCEDURE — 86900 BLOOD TYPING SEROLOGIC ABO: CPT

## 2020-11-09 PROCEDURE — 86901 BLOOD TYPING SEROLOGIC RH(D): CPT

## 2020-11-09 PROCEDURE — 72131 CT LUMBAR SPINE W/O DYE: CPT

## 2020-11-09 PROCEDURE — 700102 HCHG RX REV CODE 250 W/ 637 OVERRIDE(OP): Performed by: EMERGENCY MEDICINE

## 2020-11-09 PROCEDURE — 72128 CT CHEST SPINE W/O DYE: CPT

## 2020-11-09 PROCEDURE — 70450 CT HEAD/BRAIN W/O DYE: CPT

## 2020-11-09 PROCEDURE — 85025 COMPLETE CBC W/AUTO DIFF WBC: CPT

## 2020-11-09 RX ORDER — ACETAMINOPHEN 500 MG
1000 TABLET ORAL ONCE
Status: DISCONTINUED | OUTPATIENT
Start: 2020-11-09 | End: 2020-11-09 | Stop reason: HOSPADM

## 2020-11-09 RX ADMIN — IBUPROFEN 800 MG: 200 TABLET, FILM COATED ORAL at 03:06

## 2020-11-09 RX ADMIN — IOHEXOL 100 ML: 350 INJECTION, SOLUTION INTRAVENOUS at 02:01
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
## 2020-11-09 NOTE — ED NOTES
Pt d/cd home IV d/c'd catheter intact, gait steady, verbalized d/c instructions and the need for follow up from ER visit. No signs of distress noted
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
## 2021-01-14 ENCOUNTER — HOSPITAL ENCOUNTER (EMERGENCY)
Facility: MEDICAL CENTER | Age: 61
End: 2021-01-14
Attending: EMERGENCY MEDICINE | Admitting: EMERGENCY MEDICINE
Payer: MEDICAID

## 2021-01-14 VITALS
BODY MASS INDEX: 30.2 KG/M2 | HEART RATE: 50 BPM | SYSTOLIC BLOOD PRESSURE: 130 MMHG | WEIGHT: 199.3 LBS | TEMPERATURE: 97.5 F | DIASTOLIC BLOOD PRESSURE: 89 MMHG | RESPIRATION RATE: 16 BRPM | OXYGEN SATURATION: 99 % | HEIGHT: 68 IN

## 2021-01-14 DIAGNOSIS — M54.12 CERVICAL RADICULOPATHY: ICD-10-CM

## 2021-01-14 DIAGNOSIS — R20.2 PARESTHESIA OF LEFT ARM: ICD-10-CM

## 2021-01-14 DIAGNOSIS — M54.12 CERVICAL RADICULOPATHY: Primary | ICD-10-CM

## 2021-01-14 PROCEDURE — A9270 NON-COVERED ITEM OR SERVICE: HCPCS | Performed by: EMERGENCY MEDICINE

## 2021-01-14 PROCEDURE — 700102 HCHG RX REV CODE 250 W/ 637 OVERRIDE(OP): Performed by: EMERGENCY MEDICINE

## 2021-01-14 PROCEDURE — 99283 EMERGENCY DEPT VISIT LOW MDM: CPT

## 2021-01-14 RX ORDER — METHYLPREDNISOLONE 4 MG/1
TABLET ORAL
Qty: 1 EACH | Refills: 0 | Status: SHIPPED | OUTPATIENT
Start: 2021-01-14

## 2021-01-14 RX ORDER — METHYLPREDNISOLONE 4 MG/1
TABLET ORAL
Qty: 1 EACH | Refills: 0 | Status: SHIPPED | OUTPATIENT
Start: 2021-01-14 | End: 2021-01-14

## 2021-01-14 RX ORDER — DEXAMETHASONE 4 MG/1
8 TABLET ORAL ONCE
Status: COMPLETED | OUTPATIENT
Start: 2021-01-14 | End: 2021-01-14

## 2021-01-14 RX ADMIN — DEXAMETHASONE 8 MG: 4 TABLET ORAL at 14:25

## 2021-01-14 ASSESSMENT — FIBROSIS 4 INDEX: FIB4 SCORE: 2.16
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
## 2021-01-14 NOTE — ED TRIAGE NOTES
Chief Complaint   Patient presents with   • Numbness     Pt reports numbnes to his left arm for over a year, sometimes the numbness is from his shoulder down or elbow down, his 3rd, 4th and 5th fingers are always numb. He is an  and does alot of over head work.

## 2021-05-25 ENCOUNTER — PRE-ADMISSION TESTING (OUTPATIENT)
Dept: ADMISSIONS | Facility: MEDICAL CENTER | Age: 61
End: 2021-05-25
Attending: ORTHOPAEDIC SURGERY
Payer: MEDICAID

## 2021-05-25 DIAGNOSIS — Z01.812 PRE-OPERATIVE LABORATORY EXAMINATION: ICD-10-CM

## 2021-05-25 PROCEDURE — U0005 INFEC AGEN DETEC AMPLI PROBE: HCPCS

## 2021-05-25 PROCEDURE — U0003 INFECTIOUS AGENT DETECTION BY NUCLEIC ACID (DNA OR RNA); SEVERE ACUTE RESPIRATORY SYNDROME CORONAVIRUS 2 (SARS-COV-2) (CORONAVIRUS DISEASE [COVID-19]), AMPLIFIED PROBE TECHNIQUE, MAKING USE OF HIGH THROUGHPUT TECHNOLOGIES AS DESCRIBED BY CMS-2020-01-R: HCPCS

## 2021-05-25 PROCEDURE — C9803 HOPD COVID-19 SPEC COLLECT: HCPCS

## 2021-05-25 ASSESSMENT — FIBROSIS 4 INDEX: FIB4 SCORE: 2.16

## 2021-05-26 LAB
SARS-COV-2 RNA RESP QL NAA+PROBE: NOTDETECTED
SPECIMEN SOURCE: NORMAL

## 2021-05-26 NOTE — OR NURSING
COVID-19 Pre-surgery screening:    Pt did not answer generic voicemail was left with call back number.

## 2021-05-27 ENCOUNTER — ANESTHESIA EVENT (OUTPATIENT)
Dept: SURGERY | Facility: MEDICAL CENTER | Age: 61
End: 2021-05-27
Payer: MEDICAID

## 2021-05-27 ENCOUNTER — ANESTHESIA (OUTPATIENT)
Dept: SURGERY | Facility: MEDICAL CENTER | Age: 61
End: 2021-05-27
Payer: MEDICAID

## 2021-05-27 ENCOUNTER — HOSPITAL ENCOUNTER (OUTPATIENT)
Facility: MEDICAL CENTER | Age: 61
End: 2021-05-27
Attending: ORTHOPAEDIC SURGERY | Admitting: ORTHOPAEDIC SURGERY
Payer: MEDICAID

## 2021-05-27 VITALS
TEMPERATURE: 98.1 F | OXYGEN SATURATION: 92 % | HEART RATE: 50 BPM | DIASTOLIC BLOOD PRESSURE: 74 MMHG | SYSTOLIC BLOOD PRESSURE: 100 MMHG | HEIGHT: 69 IN | WEIGHT: 196.21 LBS | RESPIRATION RATE: 14 BRPM | BODY MASS INDEX: 29.06 KG/M2

## 2021-05-27 PROCEDURE — A9270 NON-COVERED ITEM OR SERVICE: HCPCS | Performed by: ANESTHESIOLOGY

## 2021-05-27 PROCEDURE — 160025 RECOVERY II MINUTES (STATS): Performed by: ORTHOPAEDIC SURGERY

## 2021-05-27 PROCEDURE — 700101 HCHG RX REV CODE 250: Performed by: ANESTHESIOLOGY

## 2021-05-27 PROCEDURE — 501838 HCHG SUTURE GENERAL: Performed by: ORTHOPAEDIC SURGERY

## 2021-05-27 PROCEDURE — 160046 HCHG PACU - 1ST 60 MINS PHASE II: Performed by: ORTHOPAEDIC SURGERY

## 2021-05-27 PROCEDURE — A9270 NON-COVERED ITEM OR SERVICE: HCPCS | Performed by: ORTHOPAEDIC SURGERY

## 2021-05-27 PROCEDURE — 160009 HCHG ANES TIME/MIN: Performed by: ORTHOPAEDIC SURGERY

## 2021-05-27 PROCEDURE — 160028 HCHG SURGERY MINUTES - 1ST 30 MINS LEVEL 3: Performed by: ORTHOPAEDIC SURGERY

## 2021-05-27 PROCEDURE — 700105 HCHG RX REV CODE 258: Performed by: ORTHOPAEDIC SURGERY

## 2021-05-27 PROCEDURE — 160036 HCHG PACU - EA ADDL 30 MINS PHASE I: Performed by: ORTHOPAEDIC SURGERY

## 2021-05-27 PROCEDURE — 160048 HCHG OR STATISTICAL LEVEL 1-5: Performed by: ORTHOPAEDIC SURGERY

## 2021-05-27 PROCEDURE — 700111 HCHG RX REV CODE 636 W/ 250 OVERRIDE (IP): Performed by: ANESTHESIOLOGY

## 2021-05-27 PROCEDURE — 160035 HCHG PACU - 1ST 60 MINS PHASE I: Performed by: ORTHOPAEDIC SURGERY

## 2021-05-27 PROCEDURE — 700101 HCHG RX REV CODE 250: Performed by: ORTHOPAEDIC SURGERY

## 2021-05-27 PROCEDURE — 700102 HCHG RX REV CODE 250 W/ 637 OVERRIDE(OP): Performed by: ANESTHESIOLOGY

## 2021-05-27 PROCEDURE — 160002 HCHG RECOVERY MINUTES (STAT): Performed by: ORTHOPAEDIC SURGERY

## 2021-05-27 RX ORDER — HYDRALAZINE HYDROCHLORIDE 20 MG/ML
5 INJECTION INTRAMUSCULAR; INTRAVENOUS
Status: DISCONTINUED | OUTPATIENT
Start: 2021-05-27 | End: 2021-05-27 | Stop reason: HOSPADM

## 2021-05-27 RX ORDER — DIPHENHYDRAMINE HYDROCHLORIDE 50 MG/ML
12.5 INJECTION INTRAMUSCULAR; INTRAVENOUS
Status: DISCONTINUED | OUTPATIENT
Start: 2021-05-27 | End: 2021-05-27 | Stop reason: HOSPADM

## 2021-05-27 RX ORDER — ONDANSETRON 2 MG/ML
4 INJECTION INTRAMUSCULAR; INTRAVENOUS
Status: DISCONTINUED | OUTPATIENT
Start: 2021-05-27 | End: 2021-05-27 | Stop reason: HOSPADM

## 2021-05-27 RX ORDER — ONDANSETRON 2 MG/ML
INJECTION INTRAMUSCULAR; INTRAVENOUS PRN
Status: DISCONTINUED | OUTPATIENT
Start: 2021-05-27 | End: 2021-05-27 | Stop reason: SURG

## 2021-05-27 RX ORDER — SODIUM CHLORIDE, SODIUM LACTATE, POTASSIUM CHLORIDE, CALCIUM CHLORIDE 600; 310; 30; 20 MG/100ML; MG/100ML; MG/100ML; MG/100ML
INJECTION, SOLUTION INTRAVENOUS CONTINUOUS
Status: DISCONTINUED | OUTPATIENT
Start: 2021-05-27 | End: 2021-05-27 | Stop reason: HOSPADM

## 2021-05-27 RX ORDER — PHENYLEPHRINE HYDROCHLORIDE 10 MG/ML
INJECTION, SOLUTION INTRAMUSCULAR; INTRAVENOUS; SUBCUTANEOUS PRN
Status: DISCONTINUED | OUTPATIENT
Start: 2021-05-27 | End: 2021-05-27 | Stop reason: SURG

## 2021-05-27 RX ORDER — LIDOCAINE HYDROCHLORIDE 10 MG/ML
INJECTION, SOLUTION INFILTRATION; PERINEURAL
Status: DISCONTINUED | OUTPATIENT
Start: 2021-05-27 | End: 2021-05-27 | Stop reason: HOSPADM

## 2021-05-27 RX ORDER — LABETALOL HYDROCHLORIDE 5 MG/ML
5 INJECTION, SOLUTION INTRAVENOUS
Status: DISCONTINUED | OUTPATIENT
Start: 2021-05-27 | End: 2021-05-27 | Stop reason: HOSPADM

## 2021-05-27 RX ORDER — MEPERIDINE HYDROCHLORIDE 25 MG/ML
6.25 INJECTION INTRAMUSCULAR; INTRAVENOUS; SUBCUTANEOUS
Status: DISCONTINUED | OUTPATIENT
Start: 2021-05-27 | End: 2021-05-27 | Stop reason: HOSPADM

## 2021-05-27 RX ORDER — CEFAZOLIN SODIUM 1 G/3ML
INJECTION, POWDER, FOR SOLUTION INTRAMUSCULAR; INTRAVENOUS PRN
Status: DISCONTINUED | OUTPATIENT
Start: 2021-05-27 | End: 2021-05-27 | Stop reason: SURG

## 2021-05-27 RX ORDER — KETOROLAC TROMETHAMINE 30 MG/ML
INJECTION, SOLUTION INTRAMUSCULAR; INTRAVENOUS PRN
Status: DISCONTINUED | OUTPATIENT
Start: 2021-05-27 | End: 2021-05-27 | Stop reason: SURG

## 2021-05-27 RX ORDER — BUPIVACAINE HYDROCHLORIDE 5 MG/ML
INJECTION, SOLUTION EPIDURAL; INTRACAUDAL
Status: DISCONTINUED | OUTPATIENT
Start: 2021-05-27 | End: 2021-05-27 | Stop reason: HOSPADM

## 2021-05-27 RX ORDER — HALOPERIDOL 5 MG/ML
1 INJECTION INTRAMUSCULAR
Status: DISCONTINUED | OUTPATIENT
Start: 2021-05-27 | End: 2021-05-27 | Stop reason: HOSPADM

## 2021-05-27 RX ORDER — OXYCODONE HCL 5 MG/5 ML
5 SOLUTION, ORAL ORAL
Status: COMPLETED | OUTPATIENT
Start: 2021-05-27 | End: 2021-05-27

## 2021-05-27 RX ORDER — LIDOCAINE HYDROCHLORIDE 20 MG/ML
INJECTION, SOLUTION EPIDURAL; INFILTRATION; INTRACAUDAL; PERINEURAL PRN
Status: DISCONTINUED | OUTPATIENT
Start: 2021-05-27 | End: 2021-05-27 | Stop reason: SURG

## 2021-05-27 RX ORDER — OXYCODONE HCL 5 MG/5 ML
10 SOLUTION, ORAL ORAL
Status: COMPLETED | OUTPATIENT
Start: 2021-05-27 | End: 2021-05-27

## 2021-05-27 RX ADMIN — POVIDONE IODINE 15 ML: 100 SOLUTION TOPICAL at 09:01

## 2021-05-27 RX ADMIN — FENTANYL CITRATE 100 MCG: 50 INJECTION, SOLUTION INTRAMUSCULAR; INTRAVENOUS at 09:13

## 2021-05-27 RX ADMIN — KETOROLAC TROMETHAMINE 30 MG: 30 INJECTION, SOLUTION INTRAMUSCULAR at 09:15

## 2021-05-27 RX ADMIN — LIDOCAINE HYDROCHLORIDE 0.5 ML: 10 INJECTION, SOLUTION INFILTRATION; PERINEURAL at 09:02

## 2021-05-27 RX ADMIN — OXYCODONE HYDROCHLORIDE 10 MG: 5 SOLUTION ORAL at 10:34

## 2021-05-27 RX ADMIN — FENTANYL CITRATE 50 MCG: 50 INJECTION, SOLUTION INTRAMUSCULAR; INTRAVENOUS at 10:34

## 2021-05-27 RX ADMIN — PROPOFOL 200 MG: 10 INJECTION, EMULSION INTRAVENOUS at 09:13

## 2021-05-27 RX ADMIN — EPHEDRINE SULFATE 10 MG: 50 INJECTION INTRAMUSCULAR; INTRAVENOUS; SUBCUTANEOUS at 09:25

## 2021-05-27 RX ADMIN — CEFAZOLIN 2 G: 330 INJECTION, POWDER, FOR SOLUTION INTRAMUSCULAR; INTRAVENOUS at 09:13

## 2021-05-27 RX ADMIN — FENTANYL CITRATE 50 MCG: 50 INJECTION, SOLUTION INTRAMUSCULAR; INTRAVENOUS at 09:20

## 2021-05-27 RX ADMIN — SODIUM CHLORIDE, POTASSIUM CHLORIDE, SODIUM LACTATE AND CALCIUM CHLORIDE: 600; 310; 30; 20 INJECTION, SOLUTION INTRAVENOUS at 09:01

## 2021-05-27 RX ADMIN — LIDOCAINE HYDROCHLORIDE 100 MG: 20 INJECTION, SOLUTION EPIDURAL; INFILTRATION; INTRACAUDAL at 09:13

## 2021-05-27 RX ADMIN — PHENYLEPHRINE HYDROCHLORIDE 100 MCG: 10 INJECTION INTRAVENOUS at 09:30

## 2021-05-27 RX ADMIN — ONDANSETRON 4 MG: 2 INJECTION INTRAMUSCULAR; INTRAVENOUS at 09:15

## 2021-05-27 ASSESSMENT — PAIN DESCRIPTION - PAIN TYPE
TYPE: SURGICAL PAIN
TYPE: CHRONIC PAIN

## 2021-05-27 ASSESSMENT — FIBROSIS 4 INDEX: FIB4 SCORE: 2.16

## 2021-05-27 ASSESSMENT — PAIN SCALES - GENERAL: PAIN_LEVEL: 3

## 2021-05-27 NOTE — ANESTHESIA PREPROCEDURE EVALUATION
Relevant Problems   ANESTHESIA (within normal limits)      PULMONARY (within normal limits)      NEURO (within normal limits)      CARDIAC (within normal limits)      GI (within normal limits)       (within normal limits)      ENDO   (positive) Idiopathic hypothyroidism      Other   (positive) Alcohol abuse   (positive) Obesity (BMI 30-39.9)       Physical Exam    Airway   Mallampati: II  TM distance: >3 FB  Neck ROM: full       Cardiovascular - normal exam  Rhythm: regular  Rate: normal  (-) murmur     Dental - normal exam           Pulmonary - normal exam  Breath sounds clear to auscultation     Abdominal    Neurological - normal exam                 Anesthesia Plan    ASA 3   ASA physical status 3 criteria: alcohol and/or substance dependence or abuse    Plan - general       Airway plan will be natural airway          Induction: intravenous    Postoperative Plan: Postoperative administration of opioids is intended.    Pertinent diagnostic labs and testing reviewed    Informed Consent:    Anesthetic plan and risks discussed with patient.    Use of blood products discussed with: patient whom consented to blood products.

## 2021-05-27 NOTE — ANESTHESIA POSTPROCEDURE EVALUATION
Patient: Jared Fernandez    Procedure Summary     Date: 05/27/21 Room / Location: [facility] ROOM 21 / SURGERY SAME DAY [facility]    Anesthesia Start: 0907 Anesthesia Stop: 0935    Procedures:       RELEASE, CARPAL TUNNEL (Left Wrist)      DECOMPRESSION, GUYON'S CANAL (Left Wrist) Diagnosis: (CARPAL TUNNEL SYNDROME LEFT)    Surgeons: Judd Lai M.D. Responsible Provider: Jabari Graham M.D.    Anesthesia Type: general ASA Status: 3          Final Anesthesia Type: general  Last vitals  BP   Blood Pressure: 100/74    Temp   36.7 °C (98.1 °F)    Pulse   (!) 50   Resp   14    SpO2   92 %      Anesthesia Post Evaluation    Patient location during evaluation: PACU  Patient participation: complete - patient participated  Level of consciousness: awake and alert  Pain score: 3    Airway patency: patent  Anesthetic complications: no  Cardiovascular status: hemodynamically stable  Respiratory status: acceptable  Hydration status: euvolemic    PONV: none          No complications documented.

## 2021-05-27 NOTE — DISCHARGE INSTRUCTIONS
ACTIVITY: Rest and take it easy for the first 24 hours.  A responsible adult is recommended to remain with you during that time.  It is normal to feel sleepy.  We encourage you to not do anything that requires balance, judgment or coordination.    MILD FLU-LIKE SYMPTOMS ARE NORMAL. YOU MAY EXPERIENCE GENERALIZED MUSCLE ACHES, THROAT IRRITATION, HEADACHE AND/OR SOME NAUSEA.    FOR 24 HOURS DO NOT:  Drive, operate machinery or run household appliances.  Drink beer or alcoholic beverages.   Make important decisions or sign legal documents.    SPECIAL INSTRUCTIONS: refer to Dr. Lai's specific instructions.    DIET: No restrictions, may resume normal diet. To avoid nausea, slowly advance diet as tolerated, avoiding spicy or greasy foods for the first day.  Add more substantial food to your diet according to your physician's instructions. INCREASE FLUIDS AND FIBER TO AVOID CONSTIPATION.    FOLLOW-UP APPOINTMENT:  A follow-up appointment should be arranged with your doctor in 2 weeks; call to schedule unless already previously arranged.    You should CALL YOUR PHYSICIAN if you develop:  Fever greater than 101 degrees F.  Pain not relieved by medication, or persistent nausea or vomiting.  Excessive bleeding (blood soaking through dressing) or unexpected drainage from the wound.  Extreme redness or swelling around the incision site, drainage of pus or foul smelling drainage.  Inability to urinate or empty your bladder within 8 hours.  Problems with breathing or chest pain.    You should call 911 if you develop problems with breathing or chest pain.  If you are unable to contact your doctor or surgical center, you should go to the nearest emergency room or urgent care center.      Dr. Lai's telephone #: [phone].    If any questions arise, call your doctor.  If your doctor is not available, please feel free to call the Surgical Center at [phone]. The Contact Center is open Monday through Friday 7AM to 5PM  and may speak to a nurse at [phone], or toll free at [phone].     A registered nurse may call you a few days after your surgery to see how you are doing after your procedure.    MEDICATIONS: Resume taking daily medication.  Take prescribed pain medication with food.  If no medication is prescribed, you may take non-aspirin pain medication if needed.  PAIN MEDICATION CAN BE VERY CONSTIPATING.  Take a stool softener or laxative such as senokot, pericolace, or milk of magnesia if needed.    Prescription given for OXYCODONE.  Last pain medication given at _________________________.    If your physician has prescribed pain medication that includes Acetaminophen (Tylenol), do not take additional Acetaminophen (Tylenol) while taking the prescribed medication.    Depression / Suicide Risk    As you are discharged from this [facility] facility, it is important to learn how to keep safe from harming yourself.    Recognize the warning signs:  · Abrupt changes in personality, positive or negative- including increase in energy   · Giving away possessions  · Change in eating patterns- significant weight changes-  positive or negative  · Change in sleeping patterns- unable to sleep or sleeping all the time   · Unwillingness or inability to communicate  · Depression  · Unusual sadness, discouragement and loneliness  · Talk of wanting to die  · Neglect of personal appearance   · Rebelliousness- reckless behavior  · Withdrawal from people/activities they love  · Confusion- inability to concentrate     If you or a loved one observes any of these behaviors or has concerns about self-harm, here's what you can do:  · Talk about it- your feelings and reasons for harming yourself  · Remove any means that you might use to hurt yourself (examples: pills, rope, extension cords, firearm)  · Get professional help from the community (Mental Health, Substance Abuse, psychological counseling)  · Do not be alone:Call your Safe  Contact- someone whom you trust who will be there for you.  · Call your local CRISIS HOTLINE 151-9575 or 642-725-3027  · Call your local Children's Mobile Crisis Response Team [place] (886) 346-1045 or www.[employer]Viz  · Call the toll free National Suicide Prevention Hotlines   · National Suicide Prevention Lifeline 178-649-OAEI (8523)  · National [place] Line Network 800-SUICIDE (379-8541)

## 2021-05-27 NOTE — DISCHARGE INSTR - OTHER INFO
DR. LAI'S POST-OPERATIVE INSTRUCTIONS    You have just undergone a sugery by Dr. Lai in the operating room.  It is our wish that your postoperative recovery be as quick and comfortable as possible.  Please carefully review the following items that are important for your recovery.    After any operation, a certain degree of pain is to be expected. Take Advil (ibuprofen) and Extra Strength Tylenol as first line medications for mild to moderate pain. Taking each one every 6 hours, and staggering them so that you are taking one medicine every 3 hours, is the most effective. Refer to dosing instructions on the bottle, but in general ibuprofen dose is 600-800mg and Tylenol dose is 500-1000mg. For most small procedures, this should be enough to keep you comfortable. Take these medications until your followup visit. You may have been given a small prescription for stronger pain medicine which will help relieve more severe pain.  Pain medicine may make you drowsy so please keep this in mind.  Do not drive while taking pain medicine.      When you go home, please keep your operated arm elevated at all times (above the level of your heart).  If you do this, your swelling will resolve more quickly and your pain will be improve more quickly as well. You may also place an ice pack over your dressing or splint to help with swelling and pain.    It is also very important to keep your fingers moving after most procedures, unless you had a tendon repair or fracture repair in which case you will be in a splint. Keep all of your fingers moving through a full range of motion to prevent stiffness.    For small hand procedures such as carpal tunnel release, trigger finger release, and cyst excision, the dressing that you have on your extremity should remain on for 3-4 days. It may then be removed, you can wash the incision  gently with soap and water, and keep the incision covered with a band-aid or similar clean dressing. For larger procedures or if you have a splint on, these should remain on until follow up or as specifically instructed. If you feel that your dressing is too tight during the first 3 days after surgery, you may loosen it. It is normal to see minor staining on the dressing after surgery. If there is significant bleeding, you are advised to call the office during regular office hours to have this checked.  Make sure that your dressing is kept dry at all times.  You can take a shower if you cover your arm with a plastic bag. If your dressing gets wet, replace it with sterile dressing that you can obtain from your local drug store.    Please call our office for a follow-up appointment, [phone]. Follow up after surgery is typically 10-14 days, unless you were specifically instructed otherwise. The sutures will be removed and you may be asked to see a hand therapist to optimize your functional result. Each of the hand therapists that you will be referred to have received special training in the care of the hand and upper extremity.    If you have questions regarding your surgery postop that you feel requires attention, please call the office at [phone] during business hours, or [phone] after hours for the answering service. If you feel that you have a surgical emergency postoperatively that requires immediate attention, please call the above numbers or go to the Emergency Department and ask for the Orthopedic Surgeon on call.

## 2021-05-27 NOTE — ANESTHESIA TIME REPORT
Anesthesia Start and Stop Event Times     Date Time Event    5/27/2021 0840 Ready for Procedure     0907 Anesthesia Start     0935 Anesthesia Stop        Responsible Staff  05/27/21    Name Role Begin End    Jabari Graham M.D. Anesth 0907 0935        Preop Diagnosis (Free Text):  Pre-op Diagnosis     CARPAL TUNNEL SYNDROME LEFT        Preop Diagnosis (Codes):    Post op Diagnosis  Carpal tunnel syndrome, left      Premium Reason  Non-Premium    Comments:

## 2021-05-27 NOTE — OR NURSING
0934 -- Pt arrived from OR to [facility]. ID verified. Report received. Connected to monitor. 6L O2 via mask with oral airway. Dressing to left hand CDI. Ice pack applied and extremity elevated on pillows. Left hand cap refill < 3 sec.    0945 -- oral airway removed. Pt still very drowsy. Weaned to 4L O2 blowby.    1000 -- weaned to room air. Pt arousable, but still very drowsy. Denied pain or nausea. States numbness in left hand but tolerable.     1034 -- pt states 10/10 burning pain in center of left hand. IV fentanyl 50mcg and PO oxycodone 10mg given.    1050 -- telephone updates to priyanka Grimm. [employer] is downstairs in the car.     1100 -- pt states pain is tolerable although 8/10. Now having numbness and tingling in left hand. Wants to go home. Assisted pt to get dressed. Phase 2 criteria met.     1112 -- PIV removed. Discharge instructions given to priyanka Grimm in lobby. [employer]fa denied any questions.    1115 -- pt discharged in stable condition. Down to  area in wheelchair. All belongings taken.

## 2021-05-27 NOTE — OP REPORT
OPERATIVE NOTE     DATE OF PROCEDURE: 5/27/2021            PRE-OP DIAGNOSIS: Left carpal tunnel syndrome, left ulnar neuropathy at the wrist            POST-OP DIAGNOSIS: same            PROCEDURE: Left carpal tunnel release, left Guyon's canal release            SURGEON: Judd Lai M.D. - Primary            ANESTHESIA: General            ESTIMATED BLOOD LOSS: Minimal                   SPECIMENS: None            COMPLICATIONS: None            CONDITION: Stable to PACU            OPERATIVE INDICATIONS AND DESCRIPTION OF PROCEDURE: This is a pleasant 60-year-old gentleman who presented to my office with left carpal tunnel syndrome as well as ulnar nerve compression at Guyon's canal.  Diagnosis was confirmed with an EMG.  They failed conservative treatments.  We discussed carpal tunnel release and Guyon's canal release.  Risks including, but not limited to, bleeding, infection, stiffness, recurrence of symptoms, incomplete resolution of symptoms, expected recovery depending on preoperative EMG findings, risks of anesthesia, were discussed.  They elected to proceed.  The patient was seen in the preoperative holding area, identified, and the wrist was marked.  They were taken back to the operating room.  General anesthesia was induced by the anesthesia provider.  A tourniquet was placed on the forearm and the upper extremity was prepped and draped in usual orthopedic fashion.  A timeout was performed.  I began with a carpal tunnel release.  A 1 cm longitudinal incision was made in the palm.  I dissected through the subcutaneous tissues and down to the palmar fascia.  The palmar fascia was longitudinally split.  Deep retractors were placed.  The transverse carpal ligament was identified.  I incised the distal part of the transverse carpal ligament with a 15 blade.  I placed the Integra safeguard sled underneath the transverse carpal ligament, and above the median nerve, making sure to protect the nerve at all times.   Once I was happy with the positioning of the sled and that the nerve was protected, I slid the Integra blade proximally along the groove of the sled, transecting the remainder of the transverse carpal ligament.  I visually confirmed complete transection of the entire ligament as well as maintained protection of the median nerve. I inspected the distal part of the transverse carpal ligament to make sure it was completely transected.  The wound was irrigated with normal saline.  The skin was closed with 4-0 nylon suture.  I then proceeded with release and Guyon's canal.  A 6-8 cm longitudinal incision was made in the palm and extended in a Rolando fashion across the wrist crease in an ulnar direction. I split the fascia overlying the ulnar nerve between the hook of the hamate and the pisiform.  I divided the distal antebrachial fascia overlying the ulnar nerve as well.  The nerve was quite compressed proximally by the antebrachial fascia and also through Guyon's canal.  Distally, I identified the continuation of the sensory branch of the ulnar nerve as well as the deep motor branch and carefully decompressed these as well.  There was no significant compression distally.  The ulnar nerve was protected throughout the decompression. The wound was irrigated with normal saline.  The skin was closed with 4-0 nylon suture.  A sterile dressing was placed. The tourniquet was deflated.  The patient awoke from anesthesia and was transferred to the PACU in stable condition.

## 2021-12-06 PROBLEM — M17.11 OSTEOARTHRITIS OF RIGHT KNEE: Status: ACTIVE | Noted: 2021-12-06

## 2021-12-06 PROBLEM — Z96.9 RETAINED ORTHOPEDIC HARDWARE: Status: ACTIVE | Noted: 2021-12-06

## 2022-02-07 ENCOUNTER — HOSPITAL ENCOUNTER (OUTPATIENT)
Dept: RADIOLOGY | Facility: MEDICAL CENTER | Age: 62
End: 2022-02-07
Attending: PHYSICIAN ASSISTANT
Payer: MEDICAID

## 2022-02-07 DIAGNOSIS — M25.561 RIGHT KNEE PAIN, UNSPECIFIED CHRONICITY: ICD-10-CM

## 2022-02-07 PROCEDURE — 93971 EXTREMITY STUDY: CPT | Mod: RT

## 2024-06-30 ENCOUNTER — HOSPITAL ENCOUNTER (EMERGENCY)
Facility: MEDICAL CENTER | Age: 64
End: 2024-06-30
Payer: MEDICAID

## 2024-06-30 VITALS
WEIGHT: 178.13 LBS | DIASTOLIC BLOOD PRESSURE: 105 MMHG | HEART RATE: 70 BPM | HEIGHT: 69 IN | TEMPERATURE: 96.8 F | SYSTOLIC BLOOD PRESSURE: 160 MMHG | OXYGEN SATURATION: 94 % | RESPIRATION RATE: 16 BRPM | BODY MASS INDEX: 26.38 KG/M2

## 2024-06-30 PROCEDURE — 302449 STATCHG TRIAGE ONLY (STATISTIC)

## 2024-08-05 ENCOUNTER — APPOINTMENT (OUTPATIENT)
Dept: RADIOLOGY | Facility: MEDICAL CENTER | Age: 64
End: 2024-08-05
Attending: EMERGENCY MEDICINE

## 2024-08-05 ENCOUNTER — HOSPITAL ENCOUNTER (EMERGENCY)
Facility: MEDICAL CENTER | Age: 64
End: 2024-08-05
Attending: EMERGENCY MEDICINE

## 2024-08-05 VITALS
HEART RATE: 53 BPM | DIASTOLIC BLOOD PRESSURE: 83 MMHG | TEMPERATURE: 97 F | WEIGHT: 192 LBS | BODY MASS INDEX: 29.1 KG/M2 | HEIGHT: 68 IN | RESPIRATION RATE: 17 BRPM | SYSTOLIC BLOOD PRESSURE: 128 MMHG | OXYGEN SATURATION: 93 %

## 2024-08-05 DIAGNOSIS — R11.2 NAUSEA AND VOMITING, UNSPECIFIED VOMITING TYPE: ICD-10-CM

## 2024-08-05 DIAGNOSIS — R25.2 LEG CRAMPING: ICD-10-CM

## 2024-08-05 LAB
ALBUMIN SERPL BCP-MCNC: 3.8 G/DL (ref 3.2–4.9)
ALBUMIN/GLOB SERPL: 1.7 G/DL
ALP SERPL-CCNC: 102 U/L (ref 30–99)
ALT SERPL-CCNC: 17 U/L (ref 2–50)
ANION GAP SERPL CALC-SCNC: 14 MMOL/L (ref 7–16)
AST SERPL-CCNC: 21 U/L (ref 12–45)
BASOPHILS # BLD AUTO: 1 % (ref 0–1.8)
BASOPHILS # BLD: 0.07 K/UL (ref 0–0.12)
BILIRUB SERPL-MCNC: 0.2 MG/DL (ref 0.1–1.5)
BUN SERPL-MCNC: 16 MG/DL (ref 8–22)
CALCIUM ALBUM COR SERPL-MCNC: 8.7 MG/DL (ref 8.5–10.5)
CALCIUM SERPL-MCNC: 8.5 MG/DL (ref 8.5–10.5)
CHLORIDE SERPL-SCNC: 101 MMOL/L (ref 96–112)
CO2 SERPL-SCNC: 23 MMOL/L (ref 20–33)
CREAT SERPL-MCNC: 1 MG/DL (ref 0.5–1.4)
EKG IMPRESSION: NORMAL
EOSINOPHIL # BLD AUTO: 0.33 K/UL (ref 0–0.51)
EOSINOPHIL NFR BLD: 4.7 % (ref 0–6.9)
ERYTHROCYTE [DISTWIDTH] IN BLOOD BY AUTOMATED COUNT: 46 FL (ref 35.9–50)
GFR SERPLBLD CREATININE-BSD FMLA CKD-EPI: 85 ML/MIN/1.73 M 2
GLOBULIN SER CALC-MCNC: 2.3 G/DL (ref 1.9–3.5)
GLUCOSE SERPL-MCNC: 140 MG/DL (ref 65–99)
HCT VFR BLD AUTO: 43.7 % (ref 42–52)
HGB BLD-MCNC: 14.3 G/DL (ref 14–18)
IMM GRANULOCYTES # BLD AUTO: 0.03 K/UL (ref 0–0.11)
IMM GRANULOCYTES NFR BLD AUTO: 0.4 % (ref 0–0.9)
LIPASE SERPL-CCNC: 23 U/L (ref 11–82)
LYMPHOCYTES # BLD AUTO: 1.37 K/UL (ref 1–4.8)
LYMPHOCYTES NFR BLD: 19.7 % (ref 22–41)
MAGNESIUM SERPL-MCNC: 2.3 MG/DL (ref 1.5–2.5)
MCH RBC QN AUTO: 31.1 PG (ref 27–33)
MCHC RBC AUTO-ENTMCNC: 32.7 G/DL (ref 32.3–36.5)
MCV RBC AUTO: 95 FL (ref 81.4–97.8)
MONOCYTES # BLD AUTO: 0.84 K/UL (ref 0–0.85)
MONOCYTES NFR BLD AUTO: 12.1 % (ref 0–13.4)
NEUTROPHILS # BLD AUTO: 4.32 K/UL (ref 1.82–7.42)
NEUTROPHILS NFR BLD: 62.1 % (ref 44–72)
NRBC # BLD AUTO: 0 K/UL
NRBC BLD-RTO: 0 /100 WBC (ref 0–0.2)
PLATELET # BLD AUTO: 312 K/UL (ref 164–446)
PMV BLD AUTO: 7.8 FL (ref 9–12.9)
POTASSIUM SERPL-SCNC: 3.6 MMOL/L (ref 3.6–5.5)
PROT SERPL-MCNC: 6.1 G/DL (ref 6–8.2)
RBC # BLD AUTO: 4.6 M/UL (ref 4.7–6.1)
SODIUM SERPL-SCNC: 138 MMOL/L (ref 135–145)
TROPONIN T SERPL-MCNC: 16 NG/L (ref 6–19)
TROPONIN T SERPL-MCNC: 17 NG/L (ref 6–19)
WBC # BLD AUTO: 7 K/UL (ref 4.8–10.8)

## 2024-08-05 PROCEDURE — 83690 ASSAY OF LIPASE: CPT

## 2024-08-05 PROCEDURE — 83735 ASSAY OF MAGNESIUM: CPT

## 2024-08-05 PROCEDURE — 93005 ELECTROCARDIOGRAM TRACING: CPT

## 2024-08-05 PROCEDURE — 96375 TX/PRO/DX INJ NEW DRUG ADDON: CPT

## 2024-08-05 PROCEDURE — 700105 HCHG RX REV CODE 258: Mod: UD | Performed by: EMERGENCY MEDICINE

## 2024-08-05 PROCEDURE — 80053 COMPREHEN METABOLIC PANEL: CPT

## 2024-08-05 PROCEDURE — 36415 COLL VENOUS BLD VENIPUNCTURE: CPT

## 2024-08-05 PROCEDURE — 96365 THER/PROPH/DIAG IV INF INIT: CPT

## 2024-08-05 PROCEDURE — 99285 EMERGENCY DEPT VISIT HI MDM: CPT

## 2024-08-05 PROCEDURE — 71045 X-RAY EXAM CHEST 1 VIEW: CPT

## 2024-08-05 PROCEDURE — 85025 COMPLETE CBC W/AUTO DIFF WBC: CPT

## 2024-08-05 PROCEDURE — 93005 ELECTROCARDIOGRAM TRACING: CPT | Performed by: EMERGENCY MEDICINE

## 2024-08-05 PROCEDURE — 84484 ASSAY OF TROPONIN QUANT: CPT

## 2024-08-05 PROCEDURE — 700111 HCHG RX REV CODE 636 W/ 250 OVERRIDE (IP): Mod: UD | Performed by: EMERGENCY MEDICINE

## 2024-08-05 RX ORDER — SODIUM CHLORIDE, SODIUM LACTATE, POTASSIUM CHLORIDE, CALCIUM CHLORIDE 600; 310; 30; 20 MG/100ML; MG/100ML; MG/100ML; MG/100ML
1000 INJECTION, SOLUTION INTRAVENOUS ONCE
Status: COMPLETED | OUTPATIENT
Start: 2024-08-05 | End: 2024-08-05

## 2024-08-05 RX ORDER — MAGNESIUM SULFATE HEPTAHYDRATE 40 MG/ML
2 INJECTION, SOLUTION INTRAVENOUS ONCE
Status: DISCONTINUED | OUTPATIENT
Start: 2024-08-05 | End: 2024-08-05

## 2024-08-05 RX ORDER — MAGNESIUM SULFATE 1 G/100ML
1 INJECTION INTRAVENOUS ONCE
Status: COMPLETED | OUTPATIENT
Start: 2024-08-05 | End: 2024-08-05

## 2024-08-05 RX ORDER — MIDAZOLAM HYDROCHLORIDE 1 MG/ML
2 INJECTION INTRAMUSCULAR; INTRAVENOUS ONCE
Status: COMPLETED | OUTPATIENT
Start: 2024-08-05 | End: 2024-08-05

## 2024-08-05 RX ADMIN — SODIUM CHLORIDE, POTASSIUM CHLORIDE, SODIUM LACTATE AND CALCIUM CHLORIDE 1000 ML: 600; 310; 30; 20 INJECTION, SOLUTION INTRAVENOUS at 06:40

## 2024-08-05 RX ADMIN — MIDAZOLAM HYDROCHLORIDE 2 MG: 1 INJECTION, SOLUTION INTRAMUSCULAR; INTRAVENOUS at 08:47

## 2024-08-05 RX ADMIN — MAGNESIUM SULFATE IN DEXTROSE 1 G: 10 INJECTION, SOLUTION INTRAVENOUS at 09:34

## 2024-08-05 ASSESSMENT — PAIN DESCRIPTION - PAIN TYPE: TYPE: ACUTE PAIN

## 2024-08-05 NOTE — ED PROVIDER NOTES
ED Provider Note    CHIEF COMPLAINT  Chief Complaint   Patient presents with    N/V     Epigastric pain  Bilateral lower extremity cramps       EXTERNAL RECORDS REVIEWED  Outpatient Notes March 2024 through orthopedic clinic, started on Medrol Dosepak for wrist pain    HPI/ROS  LIMITATION TO HISTORY   Select: : None  OUTSIDE HISTORIAN(S):  none    Jared Fernandez is a 63 y.o. male who presents with bilateral lower extremity cramping as well as nausea and vomiting.  Patient reports he was in his normal state of health yesterday when he went to bed last night, he awoke this morning with cramping in his bilateral legs, subsequently followed by nausea and vomiting.  The cramping and the vomiting seem to be improved now.  He did have some lower chest/upper abdominal cramping type pain with the vomiting as well.  He reports this is also gone.  No chest pain or shortness of breath, no fevers or chills.  He reports no blood in his emesis or blood in his stool or dark tarry stool.  No fevers or chills cough or congestion or other recent illness.  No dysuria.  No focal weakness or numbness    PAST MEDICAL HISTORY   has a past medical history of Alcohol abuse, Arthritis, Collar bone fracture, and Thyroid activity decreased.    SURGICAL HISTORY   has a past surgical history that includes hernia repair; appendectomy child; tonsillectomy; other; orif, ankle (Left); carpal tunnel release (Right); orif, ankle (Right); wrist arthroscopy (Right); knee reconstruction (Right); revise ulnar nerve at wrist (Left, 5/27/2021); and carpal tunnel release (Left, 5/27/2021).    FAMILY HISTORY  Family History   Problem Relation Age of Onset    Breast Cancer Mother     Dementia Mother     Diabetes Mother     Hypertension Mother     Hyperlipidemia Mother     No Known Problems Father        SOCIAL HISTORY  Social History     Tobacco Use    Smoking status: Former     Types: Cigarettes    Smokeless tobacco: Never   Vaping Use    Vaping status:  "Never Used   Substance and Sexual Activity    Alcohol use: Yes     Alcohol/week: 16.2 oz     Types: 20 Glasses of wine, 7 Shots of liquor per week    Drug use: Not Currently     Types: Inhaled, Marijuana     Comment: marijuana    Sexual activity: Yes       CURRENT MEDICATIONS  Home Medications       Reviewed by Kiana Rojas R.N. (Registered Nurse) on 08/05/24 at 0618  Med List Status: Not Addressed     Medication Last Dose Status   levothyroxine (SYNTHROID) 75 MCG Tab  Active   methylPREDNISolone (MEDROL DOSEPAK) 4 MG Tablet Therapy Pack  Active   methylPREDNISolone (MEDROL DOSEPAK) 4 MG Tablet Therapy Pack  Active   naproxen (NAPROSYN) 500 MG Tab  Active   naproxen (NAPROSYN) 500 MG Tab  Active   Non Formulary Request  Active                  Audit from Redirected Encounters    **Home medications have not yet been reviewed for this encounter**         ALLERGIES  No Known Allergies    PHYSICAL EXAM  VITAL SIGNS: /83   Pulse (!) 53   Temp 36.1 °C (97 °F) (Temporal)   Resp 17   Ht 1.727 m (5' 8\")   Wt 87.1 kg (192 lb)   SpO2 93%   BMI 29.19 kg/m²      Pulse ox interpretation: I interpret this pulse ox as normal.  Constitutional: Alert   HENT: No signs of trauma, Bilateral external ears normal, Nose normal.   Eyes:  Conjunctiva normal, Non-icteric.   Neck: Normal range of motion, No tenderness, Supple, No stridor.   Cardiovascular: Regular rate and rhythm, no murmurs.   Thorax & Lungs: Normal breath sounds, No respiratory distress, No wheezing, No chest tenderness.   Abdomen: Soft, No tenderness, No masses, No pulsatile masses. No peritoneal signs.  Skin: Warm, Dry, No erythema, No rash.   Back: No bony tenderness, No CVA tenderness.   Extremities: Intact distal pulses, No edema, No tenderness, No cyanosis,  Negative Gentry's sign.   Musculoskeletal: Good range of motion in all major joints. No tenderness to palpation or major deformities noted.   Neurologic: Alert , Normal motor function, Normal sensory " function, No focal deficits noted.   Psychiatric: Affect normal, Judgment normal, Mood normal.               EKG/LABS  Results for orders placed or performed during the hospital encounter of 08/05/24   CBC WITH DIFFERENTIAL   Result Value Ref Range    WBC 7.0 4.8 - 10.8 K/uL    RBC 4.60 (L) 4.70 - 6.10 M/uL    Hemoglobin 14.3 14.0 - 18.0 g/dL    Hematocrit 43.7 42.0 - 52.0 %    MCV 95.0 81.4 - 97.8 fL    MCH 31.1 27.0 - 33.0 pg    MCHC 32.7 32.3 - 36.5 g/dL    RDW 46.0 35.9 - 50.0 fL    Platelet Count 312 164 - 446 K/uL    MPV 7.8 (L) 9.0 - 12.9 fL    Neutrophils-Polys 62.10 44.00 - 72.00 %    Lymphocytes 19.70 (L) 22.00 - 41.00 %    Monocytes 12.10 0.00 - 13.40 %    Eosinophils 4.70 0.00 - 6.90 %    Basophils 1.00 0.00 - 1.80 %    Immature Granulocytes 0.40 0.00 - 0.90 %    Nucleated RBC 0.00 0.00 - 0.20 /100 WBC    Neutrophils (Absolute) 4.32 1.82 - 7.42 K/uL    Lymphs (Absolute) 1.37 1.00 - 4.80 K/uL    Monos (Absolute) 0.84 0.00 - 0.85 K/uL    Eos (Absolute) 0.33 0.00 - 0.51 K/uL    Baso (Absolute) 0.07 0.00 - 0.12 K/uL    Immature Granulocytes (abs) 0.03 0.00 - 0.11 K/uL    NRBC (Absolute) 0.00 K/uL   COMP METABOLIC PANEL   Result Value Ref Range    Sodium 138 135 - 145 mmol/L    Potassium 3.6 3.6 - 5.5 mmol/L    Chloride 101 96 - 112 mmol/L    Co2 23 20 - 33 mmol/L    Anion Gap 14.0 7.0 - 16.0    Glucose 140 (H) 65 - 99 mg/dL    Bun 16 8 - 22 mg/dL    Creatinine 1.00 0.50 - 1.40 mg/dL    Calcium 8.5 8.5 - 10.5 mg/dL    Correct Calcium 8.7 8.5 - 10.5 mg/dL    AST(SGOT) 21 12 - 45 U/L    ALT(SGPT) 17 2 - 50 U/L    Alkaline Phosphatase 102 (H) 30 - 99 U/L    Total Bilirubin 0.2 0.1 - 1.5 mg/dL    Albumin 3.8 3.2 - 4.9 g/dL    Total Protein 6.1 6.0 - 8.2 g/dL    Globulin 2.3 1.9 - 3.5 g/dL    A-G Ratio 1.7 g/dL   LIPASE   Result Value Ref Range    Lipase 23 11 - 82 U/L   TROPONIN   Result Value Ref Range    Troponin T 16 6 - 19 ng/L   MAGNESIUM   Result Value Ref Range    Magnesium 2.3 1.5 - 2.5 mg/dL   ESTIMATED  GFR   Result Value Ref Range    GFR (CKD-EPI) 85 >60 mL/min/1.73 m 2   TROPONIN   Result Value Ref Range    Troponin T 17 6 - 19 ng/L   EKG   Result Value Ref Range    Report       [facility]    Test Date:  2024  Pt Name:    YASIR ALEXANDER                  Department: ER  MRN:        [id number]                      Room:       [facility]  Gender:     Male                         Technician: [id number]  :        1960                   Requested By:ER TRIAGE PROTOCOL  Order #:    [id number]                    Reading MD: YANDEL BALL MD    Measurements  Intervals                                Axis  Rate:       59                           P:          63  TN:         244                          QRS:        66  QRSD:       102                          T:          77  QT:         487  QTc:        483    Interpretive Statements  Sinus bradycardia  Prolonged TN interval  Baseline wander in lead(s) V2  No previous ECG available for comparison  Electronically Signed On 2024 11:53:40 PDT by YANDEL BALL MD         I have independently interpreted this EKG    RADIOLOGY/PROCEDURES   I have independently interpreted the diagnostic imaging associated with this visit and am waiting the final reading from the radiologist.   My preliminary interpretation is as follows: no edema    Radiologist interpretation:  DX-CHEST-PORTABLE (1 VIEW)   Final Result      No acute cardiopulmonary abnormality identified.          COURSE & MEDICAL DECISION MAKING    ASSESSMENT, COURSE AND PLAN  Care Narrative: 625 AM  Patient is evaluated the bedside and chart is reviewed.  At this point differential includes electrolyte or metabolic derangement, renal insufficiency, pancreatitis, gastritis, patient without any abdominal tenderness to suggest cholecystitis although this was also considered.  Benign abdominal exam suggest against obstruction perforation or other surgical intra-abdominal process.   Although symptoms would be atypical for referred cardiac process, this is also considered.  Have ordered for diagnostic labs, ECG, x-ray, IV fluids    8:43 AM  Patient is reevaluated, has had some return of cramping, noted cramps and some muscle fasciculations on the left leg, ordered for versed    11:35 AM  Patient reevaluated, he is feeling much improved, updated on results and he is comfortable with discharge    Hydration: Based on the patient's presentation of Acute Vomiting the patient was given IV fluids. IV Hydration was used because oral hydration was not as rapid as required. Upon recheck following hydration, the patient was improved.          PROBLEMS MANAGED  # Muscle cramps.  Patient presenting with muscle cramps, improved with treatment here, at this point with his alcohol abuse, potentially combination of dehydration and suboptimal electrolytes.  No severe electrolyte derangement or metabolic derangement.  No findings of persistent symptoms or neurovascular compromise.  Advised on appropriate hydration, dietary modifications with consideration for potassium magnesium rich foods at home.    # Nausea and vomiting.  Resolved and route.  Patient did have some nausea and vomiting earlier this morning but has had none throughout his emergency department visit and is tolerating orals well.  No findings of renal insufficiency or pancreatitis.  No findings to suggest referred cardiac process with his ECG negative troponin x 2 and nature of his symptoms.    DISPOSITION AND DISCUSSIONS    Barriers to care at this time, including but not limited to:  Patient does not have primary care, was given resources for outpatient clinic .     Decision tools and prescription drugs considered including, but not limited to: Medication modification no new medications indicated at this time .     The patient will return for new or worsening symptoms and is stable at the time of discharge.    The patient is referred to a primary  physician for blood pressure management, diabetic screening, and for all other preventative health concerns.        DISPOSITION:  Patient will be discharged home in stable condition.    FOLLOW UP:  [address] [facility] [address]  [phone]          OUTPATIENT MEDICATIONS:  New Prescriptions    No medications on file         FINAL DIAGNOSIS  1. Leg cramping    2. Nausea and vomiting, unspecified vomiting type         Electronically signed by: Shin Dailey M.D., 8/5/2024 6:38 AM

## 2024-08-05 NOTE — ED NOTES
D/C home with written and verbal instructions re: Rx, activity, f/u.  Verbalizes understanding.  Ambulated out with steady gait. Patient is A+Ox4. Patient called a friend who will drive him home.

## 2024-08-05 NOTE — ED NOTES
Patient given shirt and something to eat. Patient is at bedside calling a friend to pick him up.

## 2024-08-05 NOTE — DISCHARGE INSTRUCTIONS
Please ensure that you rest and stay well-hydrated.  Ensure that your diet has potassium and magnesium rich foods such as bananas and sweet potatoes.  Gentle stretching can be helpful as well.

## 2024-08-05 NOTE — ED TRIAGE NOTES
"Chief Complaint   Patient presents with    N/V     Epigastric pain  Bilateral lower extremity cramps     Pt BIBA from home for above mentioned complaints. Pt stated he woke up with epigastric pian, NV and bilateral leg cramps started about an hour ago. Pt reported he couldn't even walk from leg cramping. Pt received 1g tylenol, 4 mg Zofran and 500 ml IV fluids en route by EMS. Per EMS report pt's six second strip show T wave inversion on lead 2 for which he received IV fluids. Pt  per ems.     BP (!) 148/87   Pulse (!) 56   Temp 36.1 °C (97 °F) (Temporal)   Resp 17   Ht 1.727 m (5' 8\")   Wt 87.1 kg (192 lb)   SpO2 92%   BMI 29.19 kg/m²     "

## (undated) DEVICE — PACK UPPER EXTREMITY (2EA/CA)

## (undated) DEVICE — HEAD HOLDER JUNIOR/ADULT

## (undated) DEVICE — TUBING CLEARLINK DUO-VENT - C-FLO (48EA/CA)

## (undated) DEVICE — MASK ANESTHESIA ADULT  - (100/CA)

## (undated) DEVICE — ELECTRODE 850 FOAM ADHESIVE - HYDROGEL RADIOTRNSPRNT (50/PK)

## (undated) DEVICE — BANDAGE ELASTIC LATEX STERILE VELCRO 4 X 5 YDS (25EA/CA)

## (undated) DEVICE — GOWN WARMING STANDARD FLEX - (30/CA)

## (undated) DEVICE — GLOVE BIOGEL SZ 8 SURGICAL PF LTX - (50PR/BX 4BX/CA)

## (undated) DEVICE — SODIUM CHL IRRIGATION 0.9% 1000ML (12EA/CA)

## (undated) DEVICE — PROTECTOR ULNA NERVE - (36PR/CA)

## (undated) DEVICE — LACTATED RINGERS INJ 1000 ML - (14EA/CA 60CA/PF)

## (undated) DEVICE — TOURNIQUET, STERILE 18 (RED)

## (undated) DEVICE — SET LEADWIRE 5 LEAD BEDSIDE DISPOSABLE ECG (1SET OF 5/EA)

## (undated) DEVICE — KIT ANESTHESIA W/CIRCUIT & 3/LT BAG W/FILTER (20EA/CA)

## (undated) DEVICE — SUTURE 4-0 ETHILON FS-2 18 (36PK/BX)"

## (undated) DEVICE — KNIFE MINI CARPAL TUNNEL  DISPOSABLE (5EA/BX)

## (undated) DEVICE — CANISTER SUCTION 3000ML MECHANICAL FILTER AUTO SHUTOFF MEDI-VAC NONSTERILE LF DISP  (40EA/CA)

## (undated) DEVICE — SUCTION INSTRUMENT YANKAUER BULBOUS TIP W/O VENT (50EA/CA)

## (undated) DEVICE — SENSOR SPO2 NEO LNCS ADHESIVE (20/BX) SEE USER NOTES

## (undated) DEVICE — TOWEL STOP TIMEOUT SAFETY FLAG (40EA/CA)

## (undated) DEVICE — SET EXTENSION WITH 2 PORTS (48EA/CA) ***PART #2C8610 IS A SUBSTITUTE*****

## (undated) DEVICE — SUTURE GENERAL